# Patient Record
Sex: MALE | Race: WHITE | NOT HISPANIC OR LATINO | Employment: FULL TIME | URBAN - METROPOLITAN AREA
[De-identification: names, ages, dates, MRNs, and addresses within clinical notes are randomized per-mention and may not be internally consistent; named-entity substitution may affect disease eponyms.]

---

## 2022-03-20 ENCOUNTER — APPOINTMENT (OUTPATIENT)
Dept: RADIOLOGY | Facility: CLINIC | Age: 57
End: 2022-03-20
Attending: STUDENT IN AN ORGANIZED HEALTH CARE EDUCATION/TRAINING PROGRAM
Payer: COMMERCIAL

## 2022-03-20 ENCOUNTER — APPOINTMENT (OUTPATIENT)
Dept: CARDIOLOGY | Facility: CLINIC | Age: 57
End: 2022-03-20
Attending: INTERNAL MEDICINE
Payer: COMMERCIAL

## 2022-03-20 ENCOUNTER — HOSPITAL ENCOUNTER (EMERGENCY)
Facility: CLINIC | Age: 57
Discharge: HOME | End: 2022-03-20
Attending: STUDENT IN AN ORGANIZED HEALTH CARE EDUCATION/TRAINING PROGRAM | Admitting: INTERNAL MEDICINE
Payer: COMMERCIAL

## 2022-03-20 VITALS
SYSTOLIC BLOOD PRESSURE: 137 MMHG | HEIGHT: 71 IN | WEIGHT: 180 LBS | HEART RATE: 77 BPM | BODY MASS INDEX: 25.2 KG/M2 | RESPIRATION RATE: 15 BRPM | DIASTOLIC BLOOD PRESSURE: 95 MMHG | TEMPERATURE: 98 F | OXYGEN SATURATION: 95 %

## 2022-03-20 DIAGNOSIS — I42.9 CARDIOMYOPATHY, UNSPECIFIED TYPE (H): ICD-10-CM

## 2022-03-20 DIAGNOSIS — F15.10 METHAMPHETAMINE ABUSE (H): ICD-10-CM

## 2022-03-20 DIAGNOSIS — R07.9 CHEST PAIN, UNSPECIFIED TYPE: ICD-10-CM

## 2022-03-20 DIAGNOSIS — I50.21 ACUTE SYSTOLIC CONGESTIVE HEART FAILURE (H): Primary | ICD-10-CM

## 2022-03-20 PROBLEM — R06.02 SHORTNESS OF BREATH: Status: ACTIVE | Noted: 2022-03-20

## 2022-03-20 LAB
ALBUMIN SERPL-MCNC: 3.5 G/DL (ref 3.5–5)
ALP SERPL-CCNC: 70 U/L (ref 45–120)
ALT SERPL W P-5'-P-CCNC: 19 U/L (ref 0–45)
ANION GAP SERPL CALCULATED.3IONS-SCNC: 10 MMOL/L (ref 5–18)
AST SERPL W P-5'-P-CCNC: 21 U/L (ref 0–40)
ATRIAL RATE - MUSE: 94 BPM
BILIRUB DIRECT SERPL-MCNC: 0.2 MG/DL
BILIRUB SERPL-MCNC: 0.4 MG/DL (ref 0–1)
BNP SERPL-MCNC: 1987 PG/ML (ref 0–46)
BUN SERPL-MCNC: 16 MG/DL (ref 8–22)
CALCIUM SERPL-MCNC: 8.7 MG/DL (ref 8.5–10.5)
CHLORIDE BLD-SCNC: 108 MMOL/L (ref 98–107)
CHOLEST SERPL-MCNC: 179 MG/DL
CK SERPL-CCNC: 353 U/L (ref 30–190)
CO2 SERPL-SCNC: 24 MMOL/L (ref 22–31)
CREAT SERPL-MCNC: 1.05 MG/DL (ref 0.7–1.3)
DIASTOLIC BLOOD PRESSURE - MUSE: 110 MMHG
ERYTHROCYTE [DISTWIDTH] IN BLOOD BY AUTOMATED COUNT: 13.5 % (ref 10–15)
GFR SERPL CREATININE-BSD FRML MDRD: 84 ML/MIN/1.73M2
GLUCOSE BLD-MCNC: 95 MG/DL (ref 70–125)
HCT VFR BLD AUTO: 41.9 % (ref 40–53)
HDLC SERPL-MCNC: 51 MG/DL
HGB BLD-MCNC: 13.7 G/DL (ref 13.3–17.7)
HOLD SPECIMEN: NORMAL
INR PPP: 1.06 (ref 0.85–1.15)
INTERPRETATION ECG - MUSE: NORMAL
LDLC SERPL CALC-MCNC: 111 MG/DL
LVEF ECHO: NORMAL
MAGNESIUM SERPL-MCNC: 1.9 MG/DL (ref 1.8–2.6)
MCH RBC QN AUTO: 29.7 PG (ref 26.5–33)
MCHC RBC AUTO-ENTMCNC: 32.7 G/DL (ref 31.5–36.5)
MCV RBC AUTO: 91 FL (ref 78–100)
P AXIS - MUSE: 49 DEGREES
PLATELET # BLD AUTO: 219 10E3/UL (ref 150–450)
POTASSIUM BLD-SCNC: 4.1 MMOL/L (ref 3.5–5)
PR INTERVAL - MUSE: 132 MS
PROT SERPL-MCNC: 6.3 G/DL (ref 6–8)
QRS DURATION - MUSE: 104 MS
QT - MUSE: 394 MS
QTC - MUSE: 492 MS
R AXIS - MUSE: -24 DEGREES
RBC # BLD AUTO: 4.62 10E6/UL (ref 4.4–5.9)
SARS-COV-2 RNA RESP QL NAA+PROBE: NEGATIVE
SODIUM SERPL-SCNC: 142 MMOL/L (ref 136–145)
SYSTOLIC BLOOD PRESSURE - MUSE: 153 MMHG
T AXIS - MUSE: -17 DEGREES
TRIGL SERPL-MCNC: 83 MG/DL
TROPONIN I SERPL-MCNC: 0.06 NG/ML (ref 0–0.29)
TROPONIN I SERPL-MCNC: 0.07 NG/ML (ref 0–0.29)
TSH SERPL DL<=0.005 MIU/L-ACNC: 3.4 UIU/ML (ref 0.3–5)
VENTRICULAR RATE- MUSE: 94 BPM
WBC # BLD AUTO: 5.6 10E3/UL (ref 4–11)

## 2022-03-20 PROCEDURE — 87635 SARS-COV-2 COVID-19 AMP PRB: CPT | Performed by: STUDENT IN AN ORGANIZED HEALTH CARE EDUCATION/TRAINING PROGRAM

## 2022-03-20 PROCEDURE — 85610 PROTHROMBIN TIME: CPT | Performed by: STUDENT IN AN ORGANIZED HEALTH CARE EDUCATION/TRAINING PROGRAM

## 2022-03-20 PROCEDURE — 96372 THER/PROPH/DIAG INJ SC/IM: CPT | Mod: 59 | Performed by: INTERNAL MEDICINE

## 2022-03-20 PROCEDURE — 96375 TX/PRO/DX INJ NEW DRUG ADDON: CPT

## 2022-03-20 PROCEDURE — 84484 ASSAY OF TROPONIN QUANT: CPT | Performed by: STUDENT IN AN ORGANIZED HEALTH CARE EDUCATION/TRAINING PROGRAM

## 2022-03-20 PROCEDURE — 250N000011 HC RX IP 250 OP 636: Performed by: INTERNAL MEDICINE

## 2022-03-20 PROCEDURE — 84443 ASSAY THYROID STIM HORMONE: CPT | Performed by: INTERNAL MEDICINE

## 2022-03-20 PROCEDURE — 82248 BILIRUBIN DIRECT: CPT | Performed by: INTERNAL MEDICINE

## 2022-03-20 PROCEDURE — 36415 COLL VENOUS BLD VENIPUNCTURE: CPT | Performed by: STUDENT IN AN ORGANIZED HEALTH CARE EDUCATION/TRAINING PROGRAM

## 2022-03-20 PROCEDURE — 99285 EMERGENCY DEPT VISIT HI MDM: CPT | Mod: 25

## 2022-03-20 PROCEDURE — 82550 ASSAY OF CK (CPK): CPT | Performed by: INTERNAL MEDICINE

## 2022-03-20 PROCEDURE — 99223 1ST HOSP IP/OBS HIGH 75: CPT | Mod: 25 | Performed by: INTERNAL MEDICINE

## 2022-03-20 PROCEDURE — 80053 COMPREHEN METABOLIC PANEL: CPT | Performed by: STUDENT IN AN ORGANIZED HEALTH CARE EDUCATION/TRAINING PROGRAM

## 2022-03-20 PROCEDURE — 71045 X-RAY EXAM CHEST 1 VIEW: CPT

## 2022-03-20 PROCEDURE — 80061 LIPID PANEL: CPT | Performed by: INTERNAL MEDICINE

## 2022-03-20 PROCEDURE — 84484 ASSAY OF TROPONIN QUANT: CPT | Performed by: INTERNAL MEDICINE

## 2022-03-20 PROCEDURE — 85027 COMPLETE CBC AUTOMATED: CPT | Performed by: STUDENT IN AN ORGANIZED HEALTH CARE EDUCATION/TRAINING PROGRAM

## 2022-03-20 PROCEDURE — C9803 HOPD COVID-19 SPEC COLLECT: HCPCS

## 2022-03-20 PROCEDURE — 255N000002 HC RX 255 OP 636: Performed by: FAMILY MEDICINE

## 2022-03-20 PROCEDURE — 250N000011 HC RX IP 250 OP 636: Performed by: FAMILY MEDICINE

## 2022-03-20 PROCEDURE — 250N000013 HC RX MED GY IP 250 OP 250 PS 637: Performed by: INTERNAL MEDICINE

## 2022-03-20 PROCEDURE — 36415 COLL VENOUS BLD VENIPUNCTURE: CPT | Performed by: INTERNAL MEDICINE

## 2022-03-20 PROCEDURE — 93005 ELECTROCARDIOGRAM TRACING: CPT | Performed by: STUDENT IN AN ORGANIZED HEALTH CARE EDUCATION/TRAINING PROGRAM

## 2022-03-20 PROCEDURE — 99236 HOSP IP/OBS SAME DATE HI 85: CPT | Performed by: INTERNAL MEDICINE

## 2022-03-20 PROCEDURE — 83880 ASSAY OF NATRIURETIC PEPTIDE: CPT | Performed by: STUDENT IN AN ORGANIZED HEALTH CARE EDUCATION/TRAINING PROGRAM

## 2022-03-20 PROCEDURE — 93306 TTE W/DOPPLER COMPLETE: CPT | Mod: 26 | Performed by: INTERNAL MEDICINE

## 2022-03-20 PROCEDURE — 99239 HOSP IP/OBS DSCHRG MGMT >30: CPT | Performed by: FAMILY MEDICINE

## 2022-03-20 PROCEDURE — 96374 THER/PROPH/DIAG INJ IV PUSH: CPT | Mod: 59

## 2022-03-20 PROCEDURE — 83735 ASSAY OF MAGNESIUM: CPT | Performed by: INTERNAL MEDICINE

## 2022-03-20 RX ORDER — FUROSEMIDE 10 MG/ML
40 INJECTION INTRAMUSCULAR; INTRAVENOUS EVERY 12 HOURS
Status: DISCONTINUED | OUTPATIENT
Start: 2022-03-20 | End: 2022-03-20 | Stop reason: HOSPADM

## 2022-03-20 RX ORDER — LISINOPRIL 5 MG/1
5 TABLET ORAL DAILY
Status: DISCONTINUED | OUTPATIENT
Start: 2022-03-20 | End: 2022-03-20 | Stop reason: HOSPADM

## 2022-03-20 RX ORDER — CARVEDILOL 3.12 MG/1
3.12 TABLET ORAL 2 TIMES DAILY WITH MEALS
Status: DISCONTINUED | OUTPATIENT
Start: 2022-03-20 | End: 2022-03-20 | Stop reason: HOSPADM

## 2022-03-20 RX ORDER — ACETAMINOPHEN 325 MG/1
325 TABLET ORAL EVERY 6 HOURS PRN
COMMUNITY
End: 2022-03-20

## 2022-03-20 RX ORDER — ASPIRIN 81 MG/1
81 TABLET, CHEWABLE ORAL DAILY
COMMUNITY
End: 2022-03-20

## 2022-03-20 RX ORDER — FUROSEMIDE 40 MG
40 TABLET ORAL DAILY
Qty: 30 TABLET | Refills: 1 | Status: SHIPPED | OUTPATIENT
Start: 2022-03-20 | End: 2022-05-02

## 2022-03-20 RX ORDER — SPIRONOLACTONE 25 MG/1
12.5 TABLET ORAL DAILY
Qty: 30 TABLET | Refills: 1 | Status: SHIPPED | OUTPATIENT
Start: 2022-03-20 | End: 2022-05-02

## 2022-03-20 RX ORDER — FUROSEMIDE 10 MG/ML
40 INJECTION INTRAMUSCULAR; INTRAVENOUS ONCE
Status: DISCONTINUED | OUTPATIENT
Start: 2022-03-20 | End: 2022-03-20 | Stop reason: HOSPADM

## 2022-03-20 RX ORDER — OMEGA-3 FATTY ACIDS/FISH OIL 300-1000MG
400 CAPSULE ORAL EVERY 4 HOURS PRN
Status: ON HOLD | COMMUNITY
End: 2022-04-04

## 2022-03-20 RX ORDER — AMLODIPINE AND BENAZEPRIL HYDROCHLORIDE 10; 20 MG/1; MG/1
1 CAPSULE ORAL DAILY
COMMUNITY
Start: 2021-05-04 | End: 2022-03-20

## 2022-03-20 RX ORDER — LIDOCAINE 40 MG/G
CREAM TOPICAL
Status: DISCONTINUED | OUTPATIENT
Start: 2022-03-20 | End: 2022-03-20 | Stop reason: HOSPADM

## 2022-03-20 RX ORDER — SPIRONOLACTONE 25 MG
12.5 TABLET ORAL DAILY
Status: DISCONTINUED | OUTPATIENT
Start: 2022-03-20 | End: 2022-03-20 | Stop reason: HOSPADM

## 2022-03-20 RX ORDER — LISINOPRIL 5 MG/1
5 TABLET ORAL DAILY
Qty: 30 TABLET | Refills: 1 | Status: SHIPPED | OUTPATIENT
Start: 2022-03-20 | End: 2022-05-02

## 2022-03-20 RX ORDER — CARVEDILOL 3.12 MG/1
3.12 TABLET ORAL 2 TIMES DAILY WITH MEALS
Qty: 60 TABLET | Refills: 1 | Status: SHIPPED | OUTPATIENT
Start: 2022-03-20 | End: 2022-04-21

## 2022-03-20 RX ORDER — FUROSEMIDE 20 MG
40 TABLET ORAL DAILY
Status: DISCONTINUED | OUTPATIENT
Start: 2022-03-20 | End: 2022-03-20 | Stop reason: HOSPADM

## 2022-03-20 RX ORDER — AMITRIPTYLINE HYDROCHLORIDE 100 MG/1
100 TABLET ORAL AT BEDTIME
Status: ON HOLD | COMMUNITY
Start: 2021-05-04 | End: 2022-04-04

## 2022-03-20 RX ADMIN — ENOXAPARIN SODIUM 40 MG: 100 INJECTION SUBCUTANEOUS at 08:50

## 2022-03-20 RX ADMIN — LISINOPRIL 5 MG: 5 TABLET ORAL at 12:31

## 2022-03-20 RX ADMIN — SPIRONOLACTONE 12.5 MG: 25 TABLET ORAL at 12:31

## 2022-03-20 RX ADMIN — FUROSEMIDE 40 MG: 10 INJECTION, SOLUTION INTRAVENOUS at 11:32

## 2022-03-20 RX ADMIN — CARVEDILOL 3.12 MG: 3.12 TABLET, FILM COATED ORAL at 12:31

## 2022-03-20 RX ADMIN — PERFLUTREN 3 ML: 6.52 INJECTION, SUSPENSION INTRAVENOUS at 08:30

## 2022-03-20 ASSESSMENT — ACTIVITIES OF DAILY LIVING (ADL)
ADLS_ACUITY_SCORE: 8

## 2022-03-20 ASSESSMENT — ENCOUNTER SYMPTOMS
DIFFICULTY URINATING: 0
DYSURIA: 0
DIARRHEA: 0
VOMITING: 0
COUGH: 1
ABDOMINAL PAIN: 0
FEVER: 0
HEMATURIA: 0
FREQUENCY: 0
SHORTNESS OF BREATH: 1

## 2022-03-20 NOTE — ED TRIAGE NOTES
"Pt arrives with complaints of chest pain, fatigue, and leg swelling. Pt having symptoms since last week. Seen at [facility] was told he had a \"minor heart attack.\" Symptoms continuing this week, increased fatigue and worsening chest pain the last week. Worse when walking.   "

## 2022-03-20 NOTE — CONSULTS
Thank you, Dr. Stratton, for asking the [facility] team to see Mr. Kvng Vann for evaluation of CHF and shortness of breath.      Assessment/Recommendations   Assessment/Plan:  1.  Acute systolic congestive heart failure, cardiomyopathy, LVEF 25%: The patient developed shortness of breath on exertion over last several months, gradually getting worse especially over last 2 to 3 weeks.  He denies chest pain.  His troponins are normal.  His ECG showed nonspecific T wave abnormality, sinus rhythm.  His BNP is significantly elevated to 1987.  He has crackles in both lung bases.  He has a bilateral leg edema.  His echocardiogram final report is pending, preliminarily reviewed it, LV is dilated with severe global hypokinesis, estimated LVEF of 25%.  The etiology of his cardiomyopathy and the congestive heart failure most likely is secondary to methamphetamine drug abuse.  However, his mother  of a massive heart attack at age 36.  We discussed further evaluation and management.  I tried to convince the patient to stay in the hospital, start medications and have coronary angiogram evaluation tomorrow.  However, the patient insisted on leaving hospital.  He left AMA on  even his troponin was elevated at that time.  After discussion, the patient still does not want to be hospitalized.  He would like to have coronary angiogram with possible PCI as outpatient.  Meantime, give 1 dose of IV Lasix 40 mg.  If he is discharged, I recommend starting CHF medications including carvedilol 3.125 mg twice a day, lisinopril 5 mg daily, spironolactone 12.5 mg daily, Lasix of 40 mg daily.  He should follow-up with PCP in 2 to 3 days.  He is scheduled to have a coronary angiogram with possible PCI as outpatient.  He is scheduled to follow-up with the heart failure clinic in 7 to 10 days, follow-up with me in 4 weeks.  The patient understands that he could have acute heart attack, cardiac arrest if he did not  "stay in the hospital to complete the cardiac evaluation.    2.  Methamphetamine abuse: Discussed to quit methamphetamine abuse.  The patient understands now he has severe congestive heart failure.  He will quit as he said.         History of Present Illness/Subjective    It is my pleasure to see Kvng Vann at [facility] for evaluation of shortness of breath.  Kvng Vann is a 56 year old male with a medical history of chronic methamphetamine abuse.    The patient states that he developed shortness of breath over last several months, getting worse over last 2 to 3 weeks.  He went to [facility] on March 6, 2022.  He was found to have mildly elevated troponin and diagnosed with non-ST elevation MI.  However he left AMA.  He states that he did not feel well since then.  He complains of shortness of breath on exertion even with minimal activities.  He denies chest pain, palpitations.  He has occasional lightheadedness.  He has mild orthopnea.  He developed a bilateral leg edema.  Not quite sure how much weight he gained.    In the ER, his ECG showed sinus arrhythmia with nonspecific T wave abnormality.  His troponin x2 are normal.  His BNP is significantly elevated to 1987.  The patient received 20 mg of IV Lasix.  He said that he feels better.       Physical Examination Review of Systems   /71 (BP Location: Left arm, Patient Position: Semi-Dueñas's, Cuff Size: Adult Regular)   Pulse 77   Temp 98  F (36.7  C) (Oral)   Resp 15   Ht 1.803 m (5' 11\")   Wt 81.6 kg (180 lb)   SpO2 95%   BMI 25.10 kg/m    Body mass index is 25.1 kg/m .  Wt Readings from Last 3 Encounters:   03/20/22 81.6 kg (180 lb)     No intake or output data in the 24 hours ending 03/20/22 1140    General Appearance:   Awake, Alert, No acute distress.   HEENT:  No scleral icterus; the mucous membranes were moist.   Neck: No cervical bruits. No JVD. No thyromegaly.    Chest: The " spine was straight. The chest was symmetric.   Lungs:   Bibasilar crackles.  No wheezing.   Cardiovascular:   Regular rhythm and rate, normal first and second heart sounds with no murmurs. No rubs or gallops.    Abdomen:  Soft. No tenderness. Bowels sounds are present   Extremities: Equal tibial pulses.  Mild bilateral leg edema.   Skin: No rashes. Warm, Dry.   Musculoskeletal: No tenderness.   Neurologic: Oriented x 3. Mood and affect are appropriate.     A 12 point comprehensive review of systems was negative except as noted.        Medical History  Surgical History Family History Social History   Methamphetamine drug abuse No past surgical history on file. Reviewed: His mother  of massive heart attack at age 36. Social History     Socioeconomic History     Marital status: Single     Spouse name: Not on file     Number of children: Not on file     Years of education: Not on file     Highest education level: Not on file   Occupational History     Not on file   Tobacco Use     Smoking status: Not on file     Smokeless tobacco: Not on file   Substance and Sexual Activity     Alcohol use: Not on file     Drug use: Not on file     Sexual activity: Not on file   Other Topics Concern     Not on file   Social History Narrative     Not on file     Social Determinants of Health     Financial Resource Strain: Not on file   Food Insecurity: Not on file   Transportation Needs: Not on file   Physical Activity: Not on file   Stress: Not on file   Social Connections: Not on file   Intimate Partner Violence: Not on file   Housing Stability: Not on file          Medications  Allergies   Scheduled Meds:    carvedilol  3.125 mg Oral BID w/meals     enoxaparin ANTICOAGULANT  40 mg Subcutaneous Q24H     furosemide  40 mg Intravenous Q12H     furosemide  40 mg Intravenous Once     furosemide  40 mg Oral Daily     lisinopril  5 mg Oral Daily     sodium chloride (PF)  3 mL Intracatheter Q8H     spironolactone  12.5 mg Oral Daily      Continuous Infusions:  PRN Meds:.lidocaine 4%, lidocaine (buffered or not buffered), melatonin, sodium chloride (PF) No Known Allergies      Lab Results    Chemistry/lipid CBC Cardiac Enzymes/BNP/TSH/INR   Lab Results   Component Value Date    CHOL 179 03/20/2022    HDL 51 03/20/2022    TRIG 83 03/20/2022    BUN 16 03/20/2022     03/20/2022    CO2 24 03/20/2022    Lab Results   Component Value Date    WBC 5.6 03/20/2022    HGB 13.7 03/20/2022    HCT 41.9 03/20/2022    MCV 91 03/20/2022     03/20/2022    Lab Results   Component Value Date    TROPONINI 0.06 03/20/2022    BNP 1,987 (H) 03/20/2022    TSH 3.40 03/20/2022    INR 1.06 03/20/2022

## 2022-03-20 NOTE — ED PROVIDER NOTES
NAME: Kvng Vann  AGE: 56 year old male  YOB: 1965  MRN: [id number]  EVALUATION DATE & TIME: No admission date for patient encounter.    PCP: No primary care provider on file.    ED PROVIDER: Stephanie Vasquez MD.      Chief Complaint   Patient presents with     Chest Pain     Fatigue     Leg Swelling         FINAL IMPRESSION:  1. Chest pain, unspecified type        MEDICAL DECISION MAKIN:51 AM I met with the patient, obtained history, performed an initial exam, and discussed options and plan for diagnostics and treatment here in the ED.   3:42 AM I spoke to Dr. Nava - Bonny about the patient and my plan. He repots that the patient would be appropriate for admission here.   4:18 AM I discussed the case with hospitalist, Dr Blakely, who accepts the patient for admission.     Pertinent Labs & Imaging studies reviewed. (See chart for details)  ED Course as of 22 0706   Sun Mar 20, 2022   0659 MDM:   56-year-old male with history of hypertension and asthma who presents with chest pain, leg swelling.  Patient was seen 3/ at [place] for chest pain and found to have an NSTEMI but he left the emergency room AMA.  He states he continues to have exertional left shoulder pain and occasional twinges of pain in his chest.  He is not currently having any pain.  He has also had increased leg swelling and shortness of breath.  On exam he has bilateral lower extremity swelling, left greater than right.  He is otherwise nontoxic-appearing and satting well on room air.  Differential includes but not limited to ACS, PE, dissection, pericarditis, myocarditis, CHF, pneumonia, among others.    His EKG is sinus rhythm with LVH, no ST elevation.  No priors in our records to compare to.  Initial troponin is within normal range at 0.07.  He already took several aspirin over the last 24 hours at home.  His BNP is elevated at 1900.  I discussed with Dr. Gardiner from cardiology who agrees that patient  "is appropriate for admission here at [facility] and does not need an emergent cath.  Patient had recent PE study and DVT ultrasound at his [facility] visit and I have highest suspicion for cardiac cause than PE. Patient is in agreement with admission at this time.  I discussed with the hospitalist who accepted the patient for cardiac telemetry admission.       MEDICATIONS GIVEN IN THE EMERGENCY:  Medications   lidocaine 1 % 0.1-1 mL (has no administration in time range)   lidocaine (LMX4) cream (has no administration in time range)   sodium chloride (PF) 0.9% PF flush 3 mL (3 mLs Intracatheter Not Given 3/20/22 0636)   sodium chloride (PF) 0.9% PF flush 3 mL (has no administration in time range)   melatonin tablet 1 mg (has no administration in time range)   enoxaparin ANTICOAGULANT (LOVENOX) injection 40 mg (has no administration in time range)       NEW PRESCRIPTIONS STARTED AT TODAY'S ER VISIT:  New Prescriptions    No medications on file          =================================================================    HPI    Patient information was obtained from: Patient    Use of : N/A       Kvng Vann is a 56 year old male with a past medical history of HTN a nd asthma who presents to the ED for evaluation of chest pain.     Per chart review, the patient was seen in the [facility] Emergency Department on 3/6/2022 for evaluation of chest pain. At this time the patient reported early this morning at about ~0100, he developed sudden onset of left sided chest pain and shoulder pain. He described his pain as a \"heartburn like sensation,\" lasting for about one hour in duration. There was no associated dyspnea, nausea or diaphoresis with this. This later resolved on its own. At about ~1200, he had a return episode of symptoms. He took one dose of 81mg ASA and drove to the ED. Here he feels his chest pain and shoulder pain has been gradually resolving to where it feels like a 1/10- \"it just feels like " "its going away now.\" He also complains of left lower leg swelling which has been present for some time now- no calf pain with this. He has hypertension but does not take any medications for it. During this visit, the patient's troponin value was elevated to 0.104 and he was told that he was having a heart attack. The patient then left against medical advice because he wanted to go to [facility] as they have \"better cardiac care\".     Today, the patient reports that he \"hasn't been feeling right\". He states that he has been tired, that his shoulder astill ache, and that his chest pain is worse with exertion. He endorses increased leg swelling and a chronic couch. He is otherwise in his usual state of health and denies any fever, abdominal pain, vomiting, diarrhea, urinary issues, or any other cocnerns at this time.     Of note, the patient took two baby aspirins around 7:00 this morning and another around 16:00 this afternoon.     REVIEW OF SYSTEMS   Review of Systems   Constitutional: Negative for fever.   Respiratory: Positive for cough (chronic) and shortness of breath.    Cardiovascular: Positive for chest pain and leg swelling.   Gastrointestinal: Negative for abdominal pain, diarrhea and vomiting.   Genitourinary: Negative for difficulty urinating, dysuria, frequency and hematuria.   All other systems reviewed and are negative.       PAST MEDICAL HISTORY:  No past medical history on file.    PAST SURGICAL HISTORY:  No past surgical history on file.    CURRENT MEDICATIONS:      Current Facility-Administered Medications:      enoxaparin ANTICOAGULANT (LOVENOX) injection 40 mg, 40 mg, Subcutaneous, Q24H, Kamila Blakely MD     lidocaine (LMX4) cream, , Topical, Q1H PRN, Kamila Blakely MD     lidocaine 1 % 0.1-1 mL, 0.1-1 mL, Other, Q1H PRN, Kamila Blakely MD     melatonin tablet 1 mg, 1 mg, Oral, At Bedtime PRN, Kamila Blakely MD     sodium chloride (PF) 0.9% PF flush 3 mL, 3 mL, Intracatheter, Q8H, " "Kamila Blakely MD     sodium chloride (PF) 0.9% PF flush 3 mL, 3 mL, Intracatheter, q1 min prn, Kamila Blakely MD    Current Outpatient Medications:      aspirin (ASA) 81 MG chewable tablet, Take 81 mg by mouth daily, Disp: , Rfl:     ALLERGIES:  No Known Allergies    FAMILY HISTORY:  No family history on file.    SOCIAL HISTORY:   Social History     Socioeconomic History     Marital status: Single     Spouse name: Not on file     Number of children: Not on file     Years of education: Not on file     Highest education level: Not on file   Occupational History     Not on file   Tobacco Use     Smoking status: Not on file     Smokeless tobacco: Not on file   Substance and Sexual Activity     Alcohol use: Not on file     Drug use: Not on file     Sexual activity: Not on file   Other Topics Concern     Not on file   Social History Narrative     Not on file     Social Determinants of Health     Financial Resource Strain: Not on file   Food Insecurity: Not on file   Transportation Needs: Not on file   Physical Activity: Not on file   Stress: Not on file   Social Connections: Not on file   Intimate Partner Violence: Not on file   Housing Stability: Not on file       PHYSICAL EXAM:    Vitals: BP (!) 136/93   Pulse 97   Temp 98.4  F (36.9  C) (Oral)   Resp 17   Ht 1.803 m (5' 11\")   Wt 81.6 kg (180 lb)   SpO2 95%   BMI 25.10 kg/m     Constitutional: Well developed, well nourished. Comfortable appearing.  HENT: Normocephalic, atraumatic, mucous membranes moist, nose normal. Neck- Supple, gross ROM intact.   Eyes: Pupils mid-range, sclera white, no discharge  Respiratory: Clear to auscultation bilaterally, no respiratory distress, no wheezing, speaks full sentences easily.  Cardiovascular: Normal heart rate, regular rhythm, no murmurs. Bilateral lower extremity swelling (L>R). 2+ radial pulses.   GI: Soft, no tenderness to deep palpation in all quadrants, no masses.  Musculoskeletal: Moving all 4 extremities " intentionally and without pain. No obvious deformity.  Skin: Warm, dry, no rash.  Neurologic: Alert & oriented x 3, speech clear, moving all extremities spontaneously   Psychiatric: Affect normal, cooperative.     LAB:  All pertinent labs reviewed and interpreted.  Labs Ordered and Resulted from Time of ED Arrival to Time of ED Departure   BASIC METABOLIC PANEL - Abnormal       Result Value    Sodium 142      Potassium 4.1      Chloride 108 (*)     Carbon Dioxide (CO2) 24      Anion Gap 10      Urea Nitrogen 16      Creatinine 1.05      Calcium 8.7      Glucose 95      GFR Estimate 84     B-TYPE NATRIURETIC PEPTIDE ([facility] ONLY) - Abnormal    BNP 1,987 (*)    CK TOTAL - Abnormal     (*)    CBC WITH PLATELETS - Normal    WBC Count 5.6      RBC Count 4.62      Hemoglobin 13.7      Hematocrit 41.9      MCV 91      MCH 29.7      MCHC 32.7      RDW 13.5      Platelet Count 219     TROPONIN I - Normal    Troponin I 0.07     INR - Normal    INR 1.06     COVID-19 VIRUS (CORONAVIRUS) BY PCR - Normal    SARS CoV2 PCR Negative     TSH - Normal    TSH 3.40     MAGNESIUM - Normal    Magnesium 1.9     LIPID PROFILE - Normal    Cholesterol 179      Triglycerides 83      Direct Measure HDL 51      LDL Cholesterol Calculated 111     HEPATIC FUNCTION PANEL - Normal    Bilirubin Total 0.4      Bilirubin Direct 0.2      Protein Total 6.3      Albumin 3.5      Alkaline Phosphatase 70      AST 21      ALT 19     TROPONIN I   ROUTINE UA WITH MICROSCOPIC REFLEX TO CULTURE       RADIOLOGY:  XR Chest Port 1 View   Final Result   IMPRESSION: Heart size at the upper limits of normal normal pulmonary vascularity. No pulmonary infiltrates or pneumothorax. No overt osseous abnormality      Echocardiogram Complete    (Results Pending)       EKG:   Performed at: 02:54:34  Impression: Sinus rhythm with occasional premature ventricular complexes. Minimal voltage criteria for LVH, may be normal variant. Nonspecific T wave abnormality. Prolonged  QT. Abnormal ECG.  Rate: 94  Rhythm: Sinus.  QRS Interval: 104  QTc Interval: 492  Comparison: No previous ECG available for comparison.  I have independently reviewed and interpreted the EKG(s) documented above.     I, Gary Guzman, am serving as a scribe to document services personally performed by Dr. Stephanie Vasquez based on my observation and the provider's statements to me. I, Stephanie Vasquez MD attest that Gary Guzman is acting in a scribe capacity, has observed my performance of the services and has documented them in accordance with my direction.      Stephanie Vasquez M.D.  Emergency Medicine  [facility] EMERGENCY ROOM  [address] [address] [phone]  Dept: [phone]     Stephanie Vasquez MD  03/20/22 0706

## 2022-03-20 NOTE — PROGRESS NOTES
[facility] MEDICINE PROGRESS NOTE      Identification: Kvng Vann is a 56 year old male     Assessment and Plan:  This is 56 years old male with past medical history of hypertension not on any medication and methamphetamine usage.  Patient presented to emergency department early this morning with a concern of shortness of breath on exertion and chest pain.  He was recently seen at [facility] emergency department on 3/6/2022 for evaluation of chest pain.  He was diagnosed with NSTEMI.  However patient left AMA.  He told me that he did not like the way he was treated at [facility].    Recurrent Chest pain with dyspnea on exertion.  Currently pain-free. Ruled out ACS with neg trop and EKG  -3/6/22 diagnosed with NSTEMI at [facility], left AMA w/o further cardiac workup.  -Echocardiogram done pending interpretation from cardiologist  -consult cardiology.  Pending further recommendation possible coronary angiogram.  However patient would like to leave the hospital as soon as possible.  He would like cardiology to arrange for outpatient procedure.  -lipid panel ok    Dyspnea on exertion. Not hypoxic on room air.  Recent NSTEMI. CXR wnl. BLE edema with elevated BNP consistent with CHF. ? Heart failure due to ischemic cardiomyopathy  -Pending echocardiogram  -We will give Lasix.  Have not received any Lasix since arrival    Hx of HTN, not on any treatment  -consider diuretic, ACEI, BB depending if Echo with any decrease EF    Substance abuse, meth use  -Discussed about cessation    Diet: Combination Diet Low Saturated Fat Na <2400mg Diet, No Caffeine Diet  DVT Prophylaxis:  Low Risk/Ambulatory with no VTE prophylaxis indicated  Code Status: Full Code    Anticipated possible discharge: possible later today after seen by cardiology     Interval History/Subjective:  Patient denies any chest pain.  Stated that he is breathing okay if not moving around.  He does not want to stay  in the hospital.  He was asking about coming back for further work-up.  He would like to leave as soon as possible.  I discussed with patient about my concern of outside hospital heart attack.  I am asking cardiology to see patient.  He had echocardiogram done pending cardiology's interpretation    Physical Exam/Objective:  Temp:  [98  F (36.7  C)-98.4  F (36.9  C)] 98  F (36.7  C)  Pulse:  [77-99] 77  Resp:  [12-20] 15  BP: (112-153)/() 115/71  SpO2:  [94 %-96 %] 95 %    GENERAL:  Alert, appears comfortable, in no acute distress, appears stated age   HEAD:  Normocephalic, without obvious abnormality, atraumatic   EYES:  PERRL, conjunctiva/corneas clear, no scleral icterus, EOM's intact   NECK: Supple, symmetrical, trachea midline   LUNGS:   Clear to auscultation bilaterally, no rales, rhonchi, or wheezing, symmetric chest rise on inhalation, respirations unlabored   HEART:  Regular rate and rhythm, S1 and S2 normal, no murmur, rub, or gallop    ABDOMEN:   Soft, non-tender, bowel sounds active all four quadrants, no masses, no organomegaly, no rebound or guarding   EXTREMITIES: Extremities normal, atraumatic, no cyanosis or edema    SKIN: Dry to touch, no exanthems in the visualized areas   NEURO: Alert, oriented x3, moves all four extremities freely   PSYCH: Cooperative, behavior is appropriate      Medications:   Personally Reviewed.  Medications       enoxaparin ANTICOAGULANT  40 mg Subcutaneous Q24H     sodium chloride (PF)  3 mL Intracatheter Q8H       Data reviewed today: I personally reviewed all new medications, labs, imaging/diagnostics reports over the past 24 hours. Pertinent findings include:    Imaging:   Recent Results (from the past 24 hour(s))   XR Chest Port 1 View    Narrative    EXAM: XR CHEST PORT 1 VIEW  LOCATION: [facility]  DATE/TIME: 3/20/2022 3:10 AM    INDICATION: Chest pain and shortness of breath  COMPARISON: None.      Impression    IMPRESSION: Heart size  at the upper limits of normal normal pulmonary vascularity. No pulmonary infiltrates or pneumothorax. No overt osseous abnormality       Labs:  Today's lab personally reviewed    Damian Stratton MD  [facility]  Phone: #[phone]

## 2022-03-20 NOTE — PHARMACY-ADMISSION MEDICATION HISTORY
Pharmacy Note - Admission Medication History    Pertinent Provider Information: pt states that he is supposed to be taking medication for BP but he hasn't been taking it. At that time, also was ordered amitriptyline for arthritis and also has not been taking it. No fill history through insurance in past year, both of those meds were ordered in 05/2021.   ______________________________________________________________________    Prior To Admission (PTA) med list completed and updated in EMR.       PTA Med List   Medication Sig Note Last Dose     acetaminophen (TYLENOL) 325 MG tablet Take 325 mg by mouth every 6 hours as needed for mild pain  Unknown at prn     amitriptyline (ELAVIL) 100 MG tablet Take 100 mg by mouth At Bedtime 3/20/2022: Pt state that he should be taking for arthritis, no fill hx through insurance in last year. Ordered in 05/2021 Unknown at last year     amLODIPine-benazepril (LOTREL) 10-20 MG capsule Take 1 capsule by mouth daily 3/20/2022: Pt state that he should be taking for BP, no fill hx through insurance in last year. Ordered in 05/2021 Unknown at last year     aspirin (ASA) 81 MG EC tablet Take 81 mg by mouth daily  3/19/2022 at Unknown time     ibuprofen (ADVIL/MOTRIN) 200 MG capsule Take 400 mg by mouth every 4 hours as needed for mild pain or fever  Unknown at prn       [facility](s[facility]  Method of interview communication: in-person    Summary of Changes to PTA Med List  New: Tylenol, Advil  Discontinued: none  Changed: none    Patient was asked about OTC/herbal products specifically.  PTA med list reflects this.    In the past week, patient estimated taking medication this percent of the time:  less than 50% due to not taking.    Allergies were reviewed, assessed, and updated with the patient.      Patient does not use any multi-dose medications prior to admission.    The information provided in this note is only as accurate as the sources  available at the time of the update(s).    Thank you for the opportunity to participate in the care of this patient.    Ciera Yoon, PharmD  3/20/2022 9:21 AM

## 2022-03-20 NOTE — H&P (VIEW-ONLY)
Thank you, Dr. Stratton, for asking the [facility] Heart Care team to see Mr. Kvng Vann for evaluation of CHF and shortness of breath.      Assessment/Recommendations   Assessment/Plan:  1.  Acute systolic congestive heart failure, cardiomyopathy, LVEF 25%: The patient developed shortness of breath on exertion over last several months, gradually getting worse especially over last 2 to 3 weeks.  He denies chest pain.  His troponins are normal.  His ECG showed nonspecific T wave abnormality, sinus rhythm.  His BNP is significantly elevated to 1987.  He has crackles in both lung bases.  He has a bilateral leg edema.  His echocardiogram final report is pending, preliminarily reviewed it, LV is dilated with severe global hypokinesis, estimated LVEF of 25%.  The etiology of his cardiomyopathy and the congestive heart failure most likely is secondary to methamphetamine drug abuse.  However, his mother  of a massive heart attack at age 36.  We discussed further evaluation and management.  I tried to convince the patient to stay in the hospital, start medications and have coronary angiogram evaluation tomorrow.  However, the patient insisted on leaving hospital.  He left AMA on  even his troponin was elevated at that time.  After discussion, the patient still does not want to be hospitalized.  He would like to have coronary angiogram with possible PCI as outpatient.  Meantime, give 1 dose of IV Lasix 40 mg.  If he is discharged, I recommend starting CHF medications including carvedilol 3.125 mg twice a day, lisinopril 5 mg daily, spironolactone 12.5 mg daily, Lasix of 40 mg daily.  He should follow-up with PCP in 2 to 3 days.  He is scheduled to have a coronary angiogram with possible PCI as outpatient.  He is scheduled to follow-up with the heart failure clinic in 7 to 10 days, follow-up with me in 4 weeks.  The patient understands that he could have acute heart attack, cardiac arrest if he did not  "stay in the hospital to complete the cardiac evaluation.    2.  Methamphetamine abuse: Discussed to quit methamphetamine abuse.  The patient understands now he has severe congestive heart failure.  He will quit as he said.         History of Present Illness/Subjective    It is my pleasure to see Kvng Vann at [facility] for evaluation of shortness of breath.  Kvng Vann is a 56 year old male with a medical history of chronic methamphetamine abuse.    The patient states that he developed shortness of breath over last several months, getting worse over last 2 to 3 weeks.  He went to [facility] on March 6, 2022.  He was found to have mildly elevated troponin and diagnosed with non-ST elevation MI.  However he left AMA.  He states that he did not feel well since then.  He complains of shortness of breath on exertion even with minimal activities.  He denies chest pain, palpitations.  He has occasional lightheadedness.  He has mild orthopnea.  He developed a bilateral leg edema.  Not quite sure how much weight he gained.    In the ER, his ECG showed sinus arrhythmia with nonspecific T wave abnormality.  His troponin x2 are normal.  His BNP is significantly elevated to 1987.  The patient received 20 mg of IV Lasix.  He said that he feels better.       Physical Examination Review of Systems   /71 (BP Location: Left arm, Patient Position: Semi-Dueñas's, Cuff Size: Adult Regular)   Pulse 77   Temp 98  F (36.7  C) (Oral)   Resp 15   Ht 1.803 m (5' 11\")   Wt 81.6 kg (180 lb)   SpO2 95%   BMI 25.10 kg/m    Body mass index is 25.1 kg/m .  Wt Readings from Last 3 Encounters:   03/20/22 81.6 kg (180 lb)     No intake or output data in the 24 hours ending 03/20/22 1140    General Appearance:   Awake, Alert, No acute distress.   HEENT:  No scleral icterus; the mucous membranes were moist.   Neck: No cervical bruits. No JVD. No thyromegaly.    Chest: The " spine was straight. The chest was symmetric.   Lungs:   Bibasilar crackles.  No wheezing.   Cardiovascular:   Regular rhythm and rate, normal first and second heart sounds with no murmurs. No rubs or gallops.    Abdomen:  Soft. No tenderness. Bowels sounds are present   Extremities: Equal tibial pulses.  Mild bilateral leg edema.   Skin: No rashes. Warm, Dry.   Musculoskeletal: No tenderness.   Neurologic: Oriented x 3. Mood and affect are appropriate.     A 12 point comprehensive review of systems was negative except as noted.        Medical History  Surgical History Family History Social History   Methamphetamine drug abuse No past surgical history on file. Reviewed: His mother  of massive heart attack at age 36. Social History     Socioeconomic History     Marital status: Single     Spouse name: Not on file     Number of children: Not on file     Years of education: Not on file     Highest education level: Not on file   Occupational History     Not on file   Tobacco Use     Smoking status: Not on file     Smokeless tobacco: Not on file   Substance and Sexual Activity     Alcohol use: Not on file     Drug use: Not on file     Sexual activity: Not on file   Other Topics Concern     Not on file   Social History Narrative     Not on file     Social Determinants of Health     Financial Resource Strain: Not on file   Food Insecurity: Not on file   Transportation Needs: Not on file   Physical Activity: Not on file   Stress: Not on file   Social Connections: Not on file   Intimate Partner Violence: Not on file   Housing Stability: Not on file          Medications  Allergies   Scheduled Meds:    carvedilol  3.125 mg Oral BID w/meals     enoxaparin ANTICOAGULANT  40 mg Subcutaneous Q24H     furosemide  40 mg Intravenous Q12H     furosemide  40 mg Intravenous Once     furosemide  40 mg Oral Daily     lisinopril  5 mg Oral Daily     sodium chloride (PF)  3 mL Intracatheter Q8H     spironolactone  12.5 mg Oral Daily      Continuous Infusions:  PRN Meds:.lidocaine 4%, lidocaine (buffered or not buffered), melatonin, sodium chloride (PF) No Known Allergies      Lab Results    Chemistry/lipid CBC Cardiac Enzymes/BNP/TSH/INR   Lab Results   Component Value Date    CHOL 179 03/20/2022    HDL 51 03/20/2022    TRIG 83 03/20/2022    BUN 16 03/20/2022     03/20/2022    CO2 24 03/20/2022    Lab Results   Component Value Date    WBC 5.6 03/20/2022    HGB 13.7 03/20/2022    HCT 41.9 03/20/2022    MCV 91 03/20/2022     03/20/2022    Lab Results   Component Value Date    TROPONINI 0.06 03/20/2022    BNP 1,987 (H) 03/20/2022    TSH 3.40 03/20/2022    INR 1.06 03/20/2022

## 2022-03-20 NOTE — DISCHARGE SUMMARY
[facility]  Hospitalist Discharge Summary      Date of Admission:  3/20/2022  Date of Discharge:  3/20/2022  Discharging Provider: Damian Stratton MD  Discharge Service: Hospitalist Service    Discharge Diagnoses   Acute systolic congestive heart failure  Cardiomyopathy  Hypertension  Methamphetamine abuse    Follow-ups Needed After Discharge   Follow-up Appointments     Follow-up and recommended labs and tests       Follow-up with heart failure clinic within 7- 10 days.  Follow-up with cardiologist Jaden Nix MD in 4 wks             Unresulted Labs Ordered in the Past 30 Days of this Admission     No orders found from 2/18/2022 to 3/21/2022.          Discharge Disposition   Discharged to home  Condition at discharge: Stable      Hospital Course   Please see H&P note and my progress notes for detail.  In summary, this is a 56 years old male with history of methamphetamine abuse, untreated hypertension and recent NSTEMI on 3/6/2022.  On 3/6/2022 patient presented to [facility] for chest pain found to have NSTEMI but left AMA without further work-up.  Today patient presented to the ED with a concern of intermittent chest pain and dyspnea on exertion.  He found to have acute congestive heart failure.  Echocardiogram done today, final report is pending.  Preliminary result show LVEF 25% with LV dilated and severe global hypokinesis.  Cardiologist was consulted.  We tried to convince patient to stay for further treatments and work-up.  However patient adamantly would like to be discharged home. Pt spoke with cardiologist and agree to have outpatient coronary angiogram and possible PCI  Cardiologist started patient on carvedilol, lisinopril, spironolactone and Lasix.  Patient received 1 dose of IV Lasix before discharge  Patient was instructed to follow-up with heart failure clinic in 7 to 10 days and follow-up with cardiologist in 4 weeks.  Outpatient coronary angiogram will be arranged by  cardiology office    Consultations This Hospital Stay   CARDIOLOGY IP CONSULT    Code Status   Full Code    Time Spent on this Encounter   I, Damian Stratton MD, personally saw the patient today and spent greater than 30 minutes discharging this patient.       Damian Stratton MD  [facility] EMERGENCY ROOM  [address] [address]  Phone: [phone]  Fax: [phone]  ______________________________________________________________________    Physical Exam   Vital Signs: Temp: 98  F (36.7  C) Temp src: Oral BP: 115/71 Pulse: 77   Resp: 15 SpO2: 95 % O2 Device: None (Room air)    Weight: 180 lbs 0 oz  General Appearance: NAD, not on oxygen. Denied sob while sitting  Respiratory: some crackles at base. Otherwise clear  Cardiovascular: RRR  GI: soft, nt, nd  Skin: no lesion  Other: BLE +2 edema         Primary Care Physician   [facility]    Discharge Orders      Reason for your hospital stay    Chest pain and shortness of breath with exertion due to congestive heart failure exacerbation     Follow-up and recommended labs and tests     Follow-up with heart failure clinic within 7- 10 days.  Follow-up with cardiologist Jaden Nix MD in 4 wks     Activity    Your activity upon discharge: no lifting, driving, or strenuous exercise     Diet    Follow this diet upon discharge: Low cholesterol, Low fat, and Low salt       Significant Results and Procedures   Most Recent 3 BMP's:Recent Labs   Lab Test 03/20/22  0257      POTASSIUM 4.1   CHLORIDE 108*   CO2 24   BUN 16   CR 1.05   ANIONGAP 10   ILDEFONSO 8.7   GLC 95     Most Recent 3 Troponin's:No lab results found.  Most Recent 3 BNP's:No lab results found.    Discharge Medications   Current Discharge Medication List      START taking these medications    Details   carvedilol (COREG) 3.125 MG tablet Take 1 tablet (3.125 mg) by mouth 2 times daily (with meals)  Qty: 60 tablet, Refills: 1    Associated Diagnoses:  Acute systolic congestive heart failure (H)      furosemide (LASIX) 40 MG tablet Take 1 tablet (40 mg) by mouth daily  Qty: 30 tablet, Refills: 1    Associated Diagnoses: Acute systolic congestive heart failure (H)      lisinopril (ZESTRIL) 5 MG tablet Take 1 tablet (5 mg) by mouth daily  Qty: 30 tablet, Refills: 1    Associated Diagnoses: Acute systolic congestive heart failure (H)      spironolactone (ALDACTONE) 25 MG tablet Take 0.5 tablets (12.5 mg) by mouth daily  Qty: 30 tablet, Refills: 1    Associated Diagnoses: Acute systolic congestive heart failure (H)         CONTINUE these medications which have NOT CHANGED    Details   amitriptyline (ELAVIL) 100 MG tablet Take 100 mg by mouth At Bedtime      aspirin (ASA) 81 MG EC tablet Take 81 mg by mouth daily      ibuprofen (ADVIL/MOTRIN) 200 MG capsule Take 400 mg by mouth every 4 hours as needed for mild pain or fever         STOP taking these medications       acetaminophen (TYLENOL) 325 MG tablet Comments:   Reason for Stopping:         amLODIPine-benazepril (LOTREL) 10-20 MG capsule Comments:   Reason for Stopping:             Allergies   No Known Allergies

## 2022-03-20 NOTE — H&P
"[facility] MEDICINE ADMISSION HISTORY AND PHYSICAL       Assessment & Plan      1. Exertional SOB and leg swelling --- with Elevated BNP almost 2000, looks like new  acute CHF.     He had trop leak last March 6, 2022 when he was seen at the [facility]. Left AMA that time. Trop today is normal.     Possibilities includes CAD, or meth induced CHF, or poorly controlled HTN causing CHF. R/O right sided CHF     - If SOB - give lasix, right now he is comfortable with 94-95% O2 sat -- and did not appear SOB. Chest XR did not appear grossly wet  - Will get ECHO - if EF low -- consider cardiology ref  - Will continue trop checks, add TSH  - check UA for proteinuria  - Check to total CK - body aches  - Check lipids     2. HTN - not on any meds   3. History of chronic Meth use - advised, and will stop \"Now !\"  4. History of smoking since teens - tobacco cessation         VTE prophylaxis: Lovenox    IV fluids: Per order set   Diet:  cardiac  GI prophylaxis: Not indicated at this point, re-assess if needed.   Pain, sleep, and vomiting medications: Per order set  Code Status: Full,   COVID test result:  negative   COVID vaccination: No  Barriers to discharge: admitting clinical condition  Discharge Disposition and goals:  Unable to determine at this point, pending clinical progress and response to treatment. Patient may need transfer to SNF or ACR if unsafe to go home and needed treatment inappropriate at home setting OR may need home health care evaluation if care can be delivered at home settings. Consider referral to care manager/    PPE - I was wearing PPE when I met the patient - N95 mask, Surgical mask, Isolation gown, Gloves, Safety glasses.      Care plan was created based on available information provided, including patient's condition at the time of encounter.   This plan was discussed with patient and/or family members using layman's terms and have agreed to proceed.   At " the end of night shift (9PM - 730A), this case will be presented to the AM Hospitalist.    It is recommended to revise care plan if there is change in condition and/or new clinical information that are not available during my encounter.     All or some of home medication/s were not resumed on admission due to safety reasons or contraindications. Dosing and frequency may also have been modified. Please resume/review them during your visit.     70 minutes of total visit duration and greater than 50% was spent in direct evaluation of patient and coordination of care including discussion of diagnostic test results and recommended treatment. .      Jeremiah Blakely MD, MPH, FACP, [facility]  Internal Medicine - Hospitalist        Chief Complaint SOB      HISTORY     - Met him in the ED. He was awake and alert.     - He came in because of exertional SOB and fatigue. He also has leg swelling. Denies chest pain. He also has pain at the side of his neck to the left shouder since March 6. He also feels achy  - He was seen at [facility] last March 6 for CP and SOB. PE study was negative but showed heart is enlarged. Leg US was negative for DVT. His trop was mildly elevated but decided to leave despite this finding.   - Denies cough or colds. No fevers. No belly pain. No diarrhea    - He still smokes since he was in the teens till now. He denies COPD diagnosis, but had sone asthma when he was young.   - He also admits to using Meth for so many years, and last was last night.  - He has HTN but not taking meds.     - In the ED, chest XR was clear, heart looked enlarged. His BNP was almost 2000. Trop was negative.      - ROS --- No headache. No dizziness. No weakness.  No palpitations. No abdominal pain. No nausea or vomiting. No urinary symptoms. No bleeding symptoms. No weight loss. Rest of 12 point ROS was reviewed and negative.       Past Medical History     htn    Surgical History     No abdominal or chest surgery     Family  "History      (+) for  CAD - maternal side       Social History      .  Social History     Socioeconomic History     Marital status: Single     Spouse name: Not on file     Number of children: Not on file     Years of education: Not on file     Highest education level: Not on file   Occupational History     Not on file   Tobacco Use     Smoking status: Not on file     Smokeless tobacco: Not on file   Substance and Sexual Activity     Alcohol use: Not on file     Drug use: Not on file     Sexual activity: Not on file   Other Topics Concern     Not on file   Social History Narrative     Not on file     Social Determinants of Health     Financial Resource Strain: Not on file   Food Insecurity: Not on file   Transportation Needs: Not on file   Physical Activity: Not on file   Stress: Not on file   Social Connections: Not on file   Intimate Partner Violence: Not on file   Housing Stability: Not on file          Allergies      No Known Allergies      Prior to Admission Medications      No current facility-administered medications on file prior to encounter.  aspirin (ASA) 81 MG chewable tablet, Take 81 mg by mouth daily        Review of Systems     A 12 point comprehensive review of systems was negative except as noted above in HPI.    PHYSICAL EXAMINATION       Vitals      Vitals: BP (!) 153/110   Pulse 95   Temp 98.4  F (36.9  C) (Oral)   Resp 16   Ht 1.803 m (5' 11\")   Wt 81.6 kg (180 lb)   SpO2 96%   BMI 25.10 kg/m    BMI= Body mass index is 25.1 kg/m .      Examination     General Appearance:  Alert, cooperative, no distress  Head:    Normocephalic, without obvious abnormality, atraumatic  EENT:  PERRL, conjunctiva/corneas clear, EOM's intact.   Neck:   Supple, symmetrical, trachea midline, no adenopathy; no NVE  Back:  Symmetric, no curvature, no CVA tenderness  Chest/Lungs: Clear to auscultation bilaterally, respirations unlabored, No tenderness or deformity. No abdominal breathing or use of accessory " muscles.   Heart:    Regular rate and rhythm, S1 and S2 normal, no murmur, rub   or gallop  Abdomen: Soft, non-tender, bowel sounds active all four quadrants, not peritoneal on palpation. Not distended  Extremities:  Bilateral leg swelling   Skin:  Skin color, texture, turgor normal, no rashes or lesion  Neurologic:  Awake and alert, no lateralizing or localizing signs                Pertinent Lab     Results for orders placed or performed during the hospital encounter of 03/20/22   XR Chest Port 1 View    Impression    IMPRESSION: Heart size at the upper limits of normal normal pulmonary vascularity. No pulmonary infiltrates or pneumothorax. No overt osseous abnormality   CBC (+ platelets, no diff)   Result Value Ref Range    WBC Count 5.6 4.0 - 11.0 10e3/uL    RBC Count 4.62 4.40 - 5.90 10e6/uL    Hemoglobin 13.7 13.3 - 17.7 g/dL    Hematocrit 41.9 40.0 - 53.0 %    MCV 91 78 - 100 fL    MCH 29.7 26.5 - 33.0 pg    MCHC 32.7 31.5 - 36.5 g/dL    RDW 13.5 10.0 - 15.0 %    Platelet Count 219 150 - 450 10e3/uL   Basic metabolic panel   Result Value Ref Range    Sodium 142 136 - 145 mmol/L    Potassium 4.1 3.5 - 5.0 mmol/L    Chloride 108 (H) 98 - 107 mmol/L    Carbon Dioxide (CO2) 24 22 - 31 mmol/L    Anion Gap 10 5 - 18 mmol/L    Urea Nitrogen 16 8 - 22 mg/dL    Creatinine 1.05 0.70 - 1.30 mg/dL    Calcium 8.7 8.5 - 10.5 mg/dL    Glucose 95 70 - 125 mg/dL    GFR Estimate 84 >60 mL/min/1.73m2   Troponin I (now)   Result Value Ref Range    Troponin I 0.07 0.00 - 0.29 ng/mL   Result Value Ref Range    INR 1.06 0.85 - 1.15   B-Type Natriuretic Peptide ( East Only)   Result Value Ref Range    BNP 1,987 (H) 0 - 46 pg/mL   Extra Blue Top Tube   Result Value Ref Range    Hold Specimen JIC    Extra Red Top Tube   Result Value Ref Range    Hold Specimen JIC    Extra Green Top (Lithium Heparin) Tube   Result Value Ref Range    Hold Specimen JIC    Extra Purple Top Tube   Result Value Ref Range    Hold Specimen JIC    ECG  12-LEAD WITH MUSE (LHE)   Result Value Ref Range    Systolic Blood Pressure 153 mmHg    Diastolic Blood Pressure 110 mmHg    Ventricular Rate 94 BPM    Atrial Rate 94 BPM    LA Interval 132 ms    QRS Duration 104 ms     ms    QTc 492 ms    P Axis 49 degrees    R AXIS -24 degrees    T Axis -17 degrees    Interpretation ECG       Sinus rhythm with occasional Premature ventricular complexes  Minimal voltage criteria for LVH, may be normal variant  Nonspecific T wave abnormality  Prolonged QT  Abnormal ECG  No previous ECGs available  Confirmed by SEE ED PROVIDER NOTE FOR, ECG INTERPRETATION ([id number]),  MOUSTAPHA MORRELL ([id number]) on 3/20/2022 3:03:58 AM             Pertinent Radiology

## 2022-03-21 ENCOUNTER — TELEPHONE (OUTPATIENT)
Dept: CARDIOLOGY | Facility: CLINIC | Age: 57
End: 2022-03-21
Payer: COMMERCIAL

## 2022-03-21 ENCOUNTER — TELEPHONE (OUTPATIENT)
Dept: CARDIOLOGY | Facility: CLINIC | Age: 57
End: 2022-03-21

## 2022-03-21 ENCOUNTER — PATIENT OUTREACH (OUTPATIENT)
Dept: CARE COORDINATION | Facility: CLINIC | Age: 57
End: 2022-03-21
Payer: COMMERCIAL

## 2022-03-21 DIAGNOSIS — Z71.89 OTHER SPECIFIED COUNSELING: ICD-10-CM

## 2022-03-21 NOTE — TELEPHONE ENCOUNTER
[facility] Call Center    Phone Message    May a detailed message be left on voicemail: yes     Reason for Call: Appointment Intake    Referring Provider Name: Damian Alexander  Diagnosis and/or Symptoms: new heart failure, patient is to been seen in 7-10 days. No openings. Please call patient with a appt date and time.     Action Taken: Message routed to:  [facility] ([facility]): Cardio     Travel Screening: Not Applicable

## 2022-03-21 NOTE — PROGRESS NOTES
"Clinic Care Coordination Contact  [facility]: Post-Discharge Note  SITUATION                                                      Admission:    Admission Date: 03/20/22   Reason for Admission: Chest Pain, Fatigue, Leg Swelling  Discharge:   Discharge Date: 03/20/22  Discharge Diagnosis: Acute systolic congestive heart failure (H) +1 more    BACKGROUND                                                      Per hospital discharge summary and inpatient provider notes:    56-year-old male with history of hypertension and asthma who presents with chest pain, leg swelling.  Patient was seen 3/6 at [facility] for chest pain and found to have an NSTEMI but he left the emergency room AMA.  He states he continues to have exertional left shoulder pain and occasional twinges of pain in his chest.  He is not currently having any pain.  He has also had increased leg swelling and shortness of breath.  On exam he has bilateral lower extremity swelling, left greater than right.  He is otherwise nontoxic-appearing and satting well on room air.  Differential includes but not limited to ACS, PE, dissection, pericarditis, myocarditis, CHF, pneumonia, among others.    ASSESSMENT      Enrollment  Primary Care Care Coordination Status: Not a Candidate    Discharge Assessment  How are you doing now that you are home?: \"I'm good I feel pretty good\"  How are your symptoms? (Red Flag symptoms escalate to triage hotline per guidelines): Improved  Do you feel your condition is stable enough to be safe at home until your provider visit?: Yes  Does the patient have their discharge instructions? : Yes  Does the patient have questions regarding their discharge instructions? : No  Were you started on any new medications or were there changes to any of your previous medications? : Yes  Does the patient have all of their medications?: Yes  Do you have questions regarding any of your medications? : No  Do you have all of your needed medical supplies or " equipment (DME)?  (i.e. oxygen tank, CPAP, cane, etc.): Yes  Discharge follow-up appointment scheduled within 14 calendar days? : Yes  Discharge Follow Up Appointment Date: 03/24/22  Discharge Follow Up Appointment Scheduled with?: Primary Care Provider    Post-op ([facility] Only)  If the patient had a surgery or procedure, do they have any questions for a nurse?: No      PLAN                                                      Outpatient Plan:      No plan stated in patients chart or AVS.    Future Appointments   Date Time Provider Department Center   3/31/2022  1:50 PM [facility] Nix MD [facility]         For any urgent concerns, please contact our 24 hour nurse triage line: [phone])         ALIS Garcia  [phone]  Connected Care Resource [facility]

## 2022-03-21 NOTE — TELEPHONE ENCOUNTER
Received call from call center, pt looking to schedule angio.  Orders in from Dr Nix for CA poss PCI.  Note sent to procedure .  -[email or web address]

## 2022-03-21 NOTE — TELEPHONE ENCOUNTER
----- Message from Carole Sheikh sent at 3/21/2022 11:27 AM CDT -----  Regarding: RE: CA poss PCI  CORS POSS PCI WITH PTK/EMG     630AM ADMIT ON 3/24    H&P ON 3/20 WITH YUMIKO     NO ALERTS     NEGATIVE COVID TEST ON 3/20    THANKS!   -CAROLE  ----- Message -----  From: Sierra Leavitt RN  Sent: 3/21/2022  10:13 AM CDT  To: [facility] Procedure  Pool - [facility]  Subject: CA poss PCI                                      Dr Nix put in orders for CA poss PCI.  H&P would be 3/20/22.    No alerts or previous history.    Thank you,    Sierra Everett, RN

## 2022-03-23 ENCOUNTER — PREP FOR PROCEDURE (OUTPATIENT)
Dept: CARDIOLOGY | Facility: CLINIC | Age: 57
End: 2022-03-23
Payer: COMMERCIAL

## 2022-03-23 DIAGNOSIS — I42.9 CARDIOMYOPATHY, UNSPECIFIED TYPE (H): ICD-10-CM

## 2022-03-23 DIAGNOSIS — F15.10 METHAMPHETAMINE ABUSE (H): ICD-10-CM

## 2022-03-23 DIAGNOSIS — R06.02 SHORTNESS OF BREATH: ICD-10-CM

## 2022-03-23 DIAGNOSIS — I21.4 NSTEMI (NON-ST ELEVATED MYOCARDIAL INFARCTION) (H): Primary | ICD-10-CM

## 2022-03-23 RX ORDER — ASPIRIN 81 MG/1
243 TABLET, CHEWABLE ORAL ONCE
Status: CANCELLED | OUTPATIENT
Start: 2022-03-24

## 2022-03-23 RX ORDER — DIAZEPAM 5 MG
10 TABLET ORAL
Status: CANCELLED | OUTPATIENT
Start: 2022-03-24

## 2022-03-23 RX ORDER — ASPIRIN 325 MG
325 TABLET ORAL ONCE
Status: CANCELLED | OUTPATIENT
Start: 2022-03-24 | End: 2022-03-23

## 2022-03-23 RX ORDER — LIDOCAINE 40 MG/G
CREAM TOPICAL
Status: CANCELLED | OUTPATIENT
Start: 2022-03-23

## 2022-03-23 RX ORDER — FENTANYL CITRATE 50 UG/ML
25 INJECTION, SOLUTION INTRAMUSCULAR; INTRAVENOUS
Status: CANCELLED | OUTPATIENT
Start: 2022-03-24

## 2022-03-23 RX ORDER — SODIUM CHLORIDE 9 MG/ML
INJECTION, SOLUTION INTRAVENOUS CONTINUOUS
Status: CANCELLED | OUTPATIENT
Start: 2022-03-24

## 2022-03-23 NOTE — TELEPHONE ENCOUNTER
3rd voicemail left for pt:  Instructed pt to check in at [facility] at 6:30 AM, Thursday 3/24/22.    Instructed pt to have nothing to eat or drink after midnight tonight, and to take only carvedilol and 4 baby aspirin in the morning with small sip of water.  Asked for call back to [phone] to confirm message received.  elia

## 2022-03-23 NOTE — TELEPHONE ENCOUNTER
Kvng Vann  [address]  [place] [address]  [phone] (home)     PCP:  Faustino [facility]  H&P completed by:  Dr Nix 3/20/22  Admit date       4/4/22   Arrival time:       8:00 AM  Anticoagulation:  NA  Previous PCI: No  Bypass Grafts: No  Renal Issues: No  Diabetic?: No  Device?: No    Ambulation status: independent    Reason for Visit:  Telephone call to discuss pre-procedure education in preparation for: Coronary Angiogram with Possible PCI    Procedure Prep:  EKG results obtained, dated: To be completed on day of cath lab procedure  Hemogram results obtained: To be completed on day of cath lab procedure  Basic Metabolic Panel results obtained: To be completed on day of cath lab procedure  Pertinent cardiac test results: 3/20/22 echo  COVID-19 test results obtained: Completed within [place]     Patient Education    1. Your arrival time is    8:00 AM  Location is [address] [address] - Main Entrance of the Hospital  2. Please plan on being at the hospital all day.  3. At any time, emergencies and/or urgent cases may come up which could delay the start of your procedure.    COVID Testing Instructions  *Mandatory COVID Testing:   ALL Patients will need to complete a COVID test no sooner than 4 days prior to their procedure (regardless of vaccination status).      To schedule COVID testing Please call [phone]    If you want to complete this at an outside facility please call them to find out if they will have the results within the appropriate time frame and their fax number.  You will need to provide us with that information so we can send the order.    The facility completing the test will need to fax the results to [phone]    If you are running into and issues please call us.     Pre-procedure instructions  Take your temperature in the morning prior to coming in.  If your temperature is 100 F please call [facility] [phone] and notify them.  If you do not have access to a thermometer at home, please come in for testing.  If you are running a temperature your procedure may be rescheduled.  Patient instructed to not Eat or Drink after midnight.  Patient instructed to shower the evening before or the morning of the procedure.  Patient instructed to arrange for transportation home following procedure from a responsible family member or friend. No driving for at least 24 hours.  Patient instructed to have a responsible adult with them for 24 hours post-procedure.  Post-procedure follow up process.  Conscious sedation discussed.      Pre-procedure medication instructions.  Continue medications as scheduled, with a small amount of water on the day of the procedure unless indicated. (NO Diabetic Medications or Blood Thinners)  Pt instructed not to consume Alcohol, Tobacco, Caffeine, or Carbonated beverages 12 hours prior to procedure.  Patient instructed to take 324 mg of Aspirin the morning of procedure: Yes  Other medication: instructed to only take carvedilol a.m. of the procedure.    Patient states understanding of procedure and agrees to proceed.    *PATIENTS RECORDS AVAILABLE IN [facility] UNLESS OTHERWISE INDICATED*      Patient Active Problem List   Diagnosis     Shortness of breath       Current Outpatient Medications   Medication Sig Dispense Refill     amitriptyline (ELAVIL) 100 MG tablet Take 100 mg by mouth At Bedtime       aspirin (ASA) 81 MG EC tablet Take 81 mg by mouth daily       carvedilol (COREG) 3.125 MG tablet Take 1 tablet (3.125 mg) by mouth 2 times daily (with meals) 60 tablet 1     furosemide (LASIX) 40 MG tablet Take 1 tablet (40 mg) by mouth daily 30 tablet 1     ibuprofen (ADVIL/MOTRIN) 200 MG capsule Take 400 mg by mouth every 4 hours as needed for mild pain or fever       lisinopril (ZESTRIL) 5 MG tablet Take 1 tablet (5 mg) by mouth daily 30 tablet 1     spironolactone (ALDACTONE) 25 MG tablet Take  0.5 tablets (12.5 mg) by mouth daily 30 tablet 1       No Known Allergies    Sierra Leavitt RN on 3/23/2022 at 10:30 AM

## 2022-03-24 ENCOUNTER — TELEPHONE (OUTPATIENT)
Facility: CLINIC | Age: 57
End: 2022-03-24
Payer: COMMERCIAL

## 2022-03-24 DIAGNOSIS — Z11.59 ENCOUNTER FOR SCREENING FOR OTHER VIRAL DISEASES: Primary | ICD-10-CM

## 2022-03-24 NOTE — TELEPHONE ENCOUNTER
Education updated.  -estefanía    ----- Message -----  From: Carole Sheikh  Sent: 3/24/2022   2:41 PM CDT  To: JEANMARIE Dan,     This pt missed his angio today because his ride never showed up. I reached out to him and rescheduled it for Monday, 3/28. His covid test is scheduled for tomorrow as well. Let me know if you have any questions.     CORS POSS PCI WITH EMG/MY     10AM ADMIT ON 3/28    H&P ON 3/20 WITH YUMIKO     NO ALERTS     COVID TESTING ON 3/25 IN [facility]     THANKS!   -CAROLE

## 2022-03-24 NOTE — TELEPHONE ENCOUNTER
[facility] Call Center    Phone Message    May a detailed message be left on voicemail: yes     Reason for Call: Appointment Intake    Referring Provider Name: Tay  Diagnosis and/or Symptoms: pt was not able to get a ride to his appt today for the Coronary Angiogram - please call to reschedule    Action Taken: Message routed to:  [facility] cardio    Travel Screening: Not Applicable

## 2022-03-25 ENCOUNTER — LAB (OUTPATIENT)
Dept: LAB | Facility: CLINIC | Age: 57
End: 2022-03-25
Payer: COMMERCIAL

## 2022-03-25 DIAGNOSIS — Z11.59 ENCOUNTER FOR SCREENING FOR OTHER VIRAL DISEASES: ICD-10-CM

## 2022-03-25 LAB — SARS-COV-2 RNA RESP QL NAA+PROBE: NEGATIVE

## 2022-03-25 PROCEDURE — U0003 INFECTIOUS AGENT DETECTION BY NUCLEIC ACID (DNA OR RNA); SEVERE ACUTE RESPIRATORY SYNDROME CORONAVIRUS 2 (SARS-COV-2) (CORONAVIRUS DISEASE [COVID-19]), AMPLIFIED PROBE TECHNIQUE, MAKING USE OF HIGH THROUGHPUT TECHNOLOGIES AS DESCRIBED BY CMS-2020-01-R: HCPCS

## 2022-03-25 PROCEDURE — U0005 INFEC AGEN DETEC AMPLI PROBE: HCPCS

## 2022-03-25 NOTE — TELEPHONE ENCOUNTER
Able to reach pt to go over instructions.  Pt verbalized understanding and had no questions at this time.  Pt has my direct phone # for any questions.  -estefanía

## 2022-03-28 NOTE — PROGRESS NOTES
Pt called and stated he had a flat tire and would be late coming in for coronary angiogram.  Arrival time was supposed to be 10 am.  1115 am pt called and he is still waiting for his tire to be fixed. Pt unsure when he would be able to arrive.  Talked to cath lab and instructed to have him reschedule.  Instructed pt to rescheduled. Pt denies having any pain at this time. Instructed pt to go to the ER if he does start having chest pain.      Evelyne De La Rosa RN

## 2022-03-28 NOTE — TELEPHONE ENCOUNTER
No show.  -raopal    ==View-only below this line===  ----- Message -----  From: Carole Sheikh  Sent: 3/28/2022   2:47 PM CDT  To: JEANMARIE Dan     Pt called back and reschedule his angio for tomorrow. The updated info is below, let me know if you have any questions.       CORS POSS PCI WITH EMG/PTK     730AM ADMIT ON 3/29    H&P ON 3/20 WITH YUMIKO     NO ALERTS     COVID TESTING ON 3/25 IN [facility]     THANKS!   -CAROLE

## 2022-03-29 ENCOUNTER — TELEPHONE (OUTPATIENT)
Dept: CARDIOLOGY | Facility: CLINIC | Age: 57
End: 2022-03-29
Payer: COMMERCIAL

## 2022-03-29 DIAGNOSIS — R06.02 SHORTNESS OF BREATH: Primary | ICD-10-CM

## 2022-03-29 DIAGNOSIS — I42.9 CARDIOMYOPATHY, UNSPECIFIED TYPE (H): ICD-10-CM

## 2022-03-29 NOTE — PROGRESS NOTES
Pt scheduled to arrive in [facility] at 0730 for coronary angiogram. Pt not here, pt called no answer.

## 2022-03-29 NOTE — TELEPHONE ENCOUNTER
Follow-up order placed for Dr Pickard.  Message sent to [facility] for rescheduling angio.  -ra    ===View-only below this line===  ----- Message -----  From: Jaden Nix MD  Sent: 3/29/2022   4:32 PM CDT  To: Sierra Leavitt RN    Sierra Everett,    I have called the patient and discussed the angiogram with possible PCI with the patient.  The patient states that he has some intermittent chest pain.  I advised him to go to [facility] emergency room if he has chest pain again.    He requested to give him another chance to do angiogram with possible PCI.  Please explain to the Cath Lab schedule and then reschedule him for coronary angiogram with PCI again.    In addition, please schedule the patient with Dr. Pickard for advanced congestive heart failure.    Thanks  Robert

## 2022-03-29 NOTE — TELEPHONE ENCOUNTER
Pt called back to reschedule.  Informed pt we are unable to reschedule at this time because he did not show up the past 3 times he was scheduled.  Advised pt to follow-up with Dr Nix as scheduled on 3/31/22 to discuss.    Pt then went on to report he can not wait, and he needs it done because he is having chest pain this morning.  Advised pt to go to ED with chest pain and procedure may be done urgently.  Pt asked for Dr Nix's phone #, phone # was not given to pt.  Informed pt that a message had been sent to provider earlier.  Pt again reports chest pain - again advised pt to go to ED.  Pt stated he is not going to ED again and hung up.    Dr Nix - any new recommendations?  -[facility]

## 2022-03-29 NOTE — TELEPHONE ENCOUNTER
[facility] for pt to call [phone] to discuss.   Message sent to Dr Nix as well.  Pt is scheduled with Dr Nix 3/31/22.  -estefanía    [2:44 PM] Zunilda Sheikh  Kvng Vann is calling anyone and everyone today to reschedule his procedure...including Blanca Ham. She transferred his call to my .     [2:45 PM] Sierra Leavitt  I will call him now.    [2:45 PM] Zunilda Sheikh  He's desperately trying to reschedule his procedure....       ===View-only below this line===  ----- Message -----  From: Gilda Duncan  Sent: 3/29/2022  10:48 AM CDT  To: Sierra Leavitt RN, Tracey Calderon  Subject: LEN ORDERING                                      Hi Sierra Everett,     I am sure you are aware, but this patient has now no showed to his procedure three times now: on 3/24, 3/28 and 3/29. Cath lab leadership has asked us not to reschedule the procedure until the ordering provider speaks with him and we are sure he will show up.     Can you get back to us if the patient is going to proceed, or if we can cancel the case request?    Thank you!   Caitlin

## 2022-03-29 NOTE — TELEPHONE ENCOUNTER
[facility] Call Center    Phone Message    May a detailed message be left on voicemail: yes     Reason for Call: Other: Pt would like a call back as he thought his angio was tomorrow and missed it and would like a call back to reschedule     Action Taken: Message routed to:  [facility] Cardio    Travel Screening: Not Applicable

## 2022-03-30 DIAGNOSIS — Z11.59 ENCOUNTER FOR SCREENING FOR OTHER VIRAL DISEASES: Primary | ICD-10-CM

## 2022-03-30 NOTE — TELEPHONE ENCOUNTER
[facility] Call Center    Phone Message    May a detailed message be left on voicemail: no     Reason for Call: Other: Kvng called to schedule an angiogram, please reach out to him at [phone].     Action Taken: Other: [facility]    Travel Screening: Not Applicable

## 2022-03-30 NOTE — TELEPHONE ENCOUNTER
===View-only below this line===  ----- Message -----  From: Carole Sheikh  Sent: 3/30/2022   9:48 AM CDT  To: Sierra Leavitt RN    CORS POSS PCI WITH MY/MA     8AM ADMIT ON 4/4    H&P ON 3/20 WITH YUMIKO     NO ALERTS     COVID TESTING ON 3/31 IN [facility]     **PT CONFIRMED VIA PHONE CALL THAT HE WILL MAKE IT TO HIS PROCEDURE THIS TIME**     THANKS!   -CAROLE

## 2022-03-31 ENCOUNTER — LAB (OUTPATIENT)
Dept: LAB | Facility: CLINIC | Age: 57
End: 2022-03-31
Payer: COMMERCIAL

## 2022-03-31 DIAGNOSIS — Z11.59 ENCOUNTER FOR SCREENING FOR OTHER VIRAL DISEASES: ICD-10-CM

## 2022-03-31 LAB — SARS-COV-2 RNA RESP QL NAA+PROBE: NEGATIVE

## 2022-03-31 PROCEDURE — U0003 INFECTIOUS AGENT DETECTION BY NUCLEIC ACID (DNA OR RNA); SEVERE ACUTE RESPIRATORY SYNDROME CORONAVIRUS 2 (SARS-COV-2) (CORONAVIRUS DISEASE [COVID-19]), AMPLIFIED PROBE TECHNIQUE, MAKING USE OF HIGH THROUGHPUT TECHNOLOGIES AS DESCRIBED BY CMS-2020-01-R: HCPCS

## 2022-03-31 PROCEDURE — U0005 INFEC AGEN DETEC AMPLI PROBE: HCPCS

## 2022-04-01 NOTE — TELEPHONE ENCOUNTER
[facility] for pt - looking to confirm date and time for CA poss PCI 4/4/22 8:00, and to see if he had any questions.  Asked for call back to [phone].  -cinthya

## 2022-04-04 ENCOUNTER — HOSPITAL ENCOUNTER (OUTPATIENT)
Facility: HOSPITAL | Age: 57
Discharge: HOME | End: 2022-04-04
Attending: INTERNAL MEDICINE | Admitting: INTERNAL MEDICINE
Payer: COMMERCIAL

## 2022-04-04 VITALS
OXYGEN SATURATION: 99 % | TEMPERATURE: 98.1 F | RESPIRATION RATE: 20 BRPM | SYSTOLIC BLOOD PRESSURE: 103 MMHG | HEART RATE: 70 BPM | WEIGHT: 180 LBS | HEIGHT: 71 IN | BODY MASS INDEX: 25.2 KG/M2 | DIASTOLIC BLOOD PRESSURE: 58 MMHG

## 2022-04-04 DIAGNOSIS — F15.10 METHAMPHETAMINE ABUSE (H): ICD-10-CM

## 2022-04-04 DIAGNOSIS — I21.4 NSTEMI (NON-ST ELEVATED MYOCARDIAL INFARCTION) (H): ICD-10-CM

## 2022-04-04 DIAGNOSIS — I50.21 ACUTE SYSTOLIC CONGESTIVE HEART FAILURE (H): ICD-10-CM

## 2022-04-04 DIAGNOSIS — R06.02 SHORTNESS OF BREATH: ICD-10-CM

## 2022-04-04 DIAGNOSIS — I25.110 CORONARY ARTERY DISEASE INVOLVING NATIVE CORONARY ARTERY OF NATIVE HEART WITH UNSTABLE ANGINA PECTORIS (H): Primary | ICD-10-CM

## 2022-04-04 DIAGNOSIS — I42.9 CARDIOMYOPATHY, UNSPECIFIED TYPE (H): ICD-10-CM

## 2022-04-04 LAB
ABO/RH(D): NORMAL
ACT BLD: 169 SECONDS (ref 74–150)
ACT BLD: 271 SECONDS (ref 74–150)
ANION GAP SERPL CALCULATED.3IONS-SCNC: 10 MMOL/L (ref 5–18)
ANTIBODY SCREEN: NEGATIVE
ATRIAL RATE - MUSE: 65 BPM
ATRIAL RATE - MUSE: 77 BPM
BUN SERPL-MCNC: 23 MG/DL (ref 8–22)
CALCIUM SERPL-MCNC: 9 MG/DL (ref 8.5–10.5)
CHLORIDE BLD-SCNC: 103 MMOL/L (ref 98–107)
CHOLEST SERPL-MCNC: 190 MG/DL
CO2 SERPL-SCNC: 26 MMOL/L (ref 22–31)
CREAT SERPL-MCNC: 1.28 MG/DL (ref 0.7–1.3)
DIASTOLIC BLOOD PRESSURE - MUSE: NORMAL MMHG
DIASTOLIC BLOOD PRESSURE - MUSE: NORMAL MMHG
ERYTHROCYTE [DISTWIDTH] IN BLOOD BY AUTOMATED COUNT: 12.8 % (ref 10–15)
FASTING STATUS PATIENT QL REPORTED: YES
GFR SERPL CREATININE-BSD FRML MDRD: 66 ML/MIN/1.73M2
GLUCOSE BLD-MCNC: 84 MG/DL (ref 70–125)
HCT VFR BLD AUTO: 45.7 % (ref 40–53)
HDLC SERPL-MCNC: 52 MG/DL
HGB BLD-MCNC: 15.1 G/DL (ref 13.3–17.7)
INTERPRETATION ECG - MUSE: NORMAL
INTERPRETATION ECG - MUSE: NORMAL
LDLC SERPL CALC-MCNC: 116 MG/DL
MCH RBC QN AUTO: 30.3 PG (ref 26.5–33)
MCHC RBC AUTO-ENTMCNC: 33 G/DL (ref 31.5–36.5)
MCV RBC AUTO: 92 FL (ref 78–100)
P AXIS - MUSE: 45 DEGREES
P AXIS - MUSE: 51 DEGREES
PLATELET # BLD AUTO: 242 10E3/UL (ref 150–450)
POTASSIUM BLD-SCNC: 3.9 MMOL/L (ref 3.5–5)
PR INTERVAL - MUSE: 142 MS
PR INTERVAL - MUSE: 150 MS
QRS DURATION - MUSE: 104 MS
QRS DURATION - MUSE: 108 MS
QT - MUSE: 424 MS
QT - MUSE: 452 MS
QTC - MUSE: 470 MS
QTC - MUSE: 479 MS
R AXIS - MUSE: -10 DEGREES
R AXIS - MUSE: -13 DEGREES
RBC # BLD AUTO: 4.99 10E6/UL (ref 4.4–5.9)
SODIUM SERPL-SCNC: 139 MMOL/L (ref 136–145)
SPECIMEN EXPIRATION DATE: NORMAL
SYSTOLIC BLOOD PRESSURE - MUSE: NORMAL MMHG
SYSTOLIC BLOOD PRESSURE - MUSE: NORMAL MMHG
T AXIS - MUSE: -63 DEGREES
T AXIS - MUSE: -77 DEGREES
TRIGL SERPL-MCNC: 112 MG/DL
VENTRICULAR RATE- MUSE: 65 BPM
VENTRICULAR RATE- MUSE: 77 BPM
WBC # BLD AUTO: 6.4 10E3/UL (ref 4–11)

## 2022-04-04 PROCEDURE — 99152 MOD SED SAME PHYS/QHP 5/>YRS: CPT | Performed by: INTERNAL MEDICINE

## 2022-04-04 PROCEDURE — 999N000054 HC STATISTIC EKG NON-CHARGEABLE

## 2022-04-04 PROCEDURE — 36415 COLL VENOUS BLD VENIPUNCTURE: CPT | Performed by: INTERNAL MEDICINE

## 2022-04-04 PROCEDURE — 250N000013 HC RX MED GY IP 250 OP 250 PS 637: Performed by: INTERNAL MEDICINE

## 2022-04-04 PROCEDURE — 258N000003 HC RX IP 258 OP 636: Performed by: INTERNAL MEDICINE

## 2022-04-04 PROCEDURE — 80061 LIPID PANEL: CPT | Performed by: INTERNAL MEDICINE

## 2022-04-04 PROCEDURE — 93010 ELECTROCARDIOGRAM REPORT: CPT | Performed by: INTERNAL MEDICINE

## 2022-04-04 PROCEDURE — 272N000001 HC OR GENERAL SUPPLY STERILE: Performed by: INTERNAL MEDICINE

## 2022-04-04 PROCEDURE — 85347 COAGULATION TIME ACTIVATED: CPT

## 2022-04-04 PROCEDURE — C1725 CATH, TRANSLUMIN NON-LASER: HCPCS | Performed by: INTERNAL MEDICINE

## 2022-04-04 PROCEDURE — 80048 BASIC METABOLIC PNL TOTAL CA: CPT | Performed by: INTERNAL MEDICINE

## 2022-04-04 PROCEDURE — C1894 INTRO/SHEATH, NON-LASER: HCPCS | Performed by: INTERNAL MEDICINE

## 2022-04-04 PROCEDURE — 255N000002 HC RX 255 OP 636: Performed by: INTERNAL MEDICINE

## 2022-04-04 PROCEDURE — 93005 ELECTROCARDIOGRAM TRACING: CPT

## 2022-04-04 PROCEDURE — C9600 PERC DRUG-EL COR STENT SING: HCPCS | Performed by: INTERNAL MEDICINE

## 2022-04-04 PROCEDURE — 92928 PRQ TCAT PLMT NTRAC ST 1 LES: CPT | Mod: RC | Performed by: INTERNAL MEDICINE

## 2022-04-04 PROCEDURE — 93010 ELECTROCARDIOGRAM REPORT: CPT | Mod: 76 | Performed by: INTERNAL MEDICINE

## 2022-04-04 PROCEDURE — C1769 GUIDE WIRE: HCPCS | Performed by: INTERNAL MEDICINE

## 2022-04-04 PROCEDURE — C1874 STENT, COATED/COV W/DEL SYS: HCPCS | Performed by: INTERNAL MEDICINE

## 2022-04-04 PROCEDURE — 93454 CORONARY ARTERY ANGIO S&I: CPT | Mod: 26 | Performed by: INTERNAL MEDICINE

## 2022-04-04 PROCEDURE — 99153 MOD SED SAME PHYS/QHP EA: CPT | Performed by: INTERNAL MEDICINE

## 2022-04-04 PROCEDURE — 93454 CORONARY ARTERY ANGIO S&I: CPT | Performed by: INTERNAL MEDICINE

## 2022-04-04 PROCEDURE — C1887 CATHETER, GUIDING: HCPCS | Performed by: INTERNAL MEDICINE

## 2022-04-04 PROCEDURE — 250N000009 HC RX 250: Performed by: INTERNAL MEDICINE

## 2022-04-04 PROCEDURE — 250N000011 HC RX IP 250 OP 636: Performed by: INTERNAL MEDICINE

## 2022-04-04 PROCEDURE — 85027 COMPLETE CBC AUTOMATED: CPT | Performed by: INTERNAL MEDICINE

## 2022-04-04 PROCEDURE — 86901 BLOOD TYPING SEROLOGIC RH(D): CPT | Performed by: INTERNAL MEDICINE

## 2022-04-04 DEVICE — STENT CORONARY DES SYNERGY XD MR US 3.00X38MM H7493941838300: Type: IMPLANTABLE DEVICE | Status: FUNCTIONAL

## 2022-04-04 RX ORDER — LIDOCAINE 40 MG/G
CREAM TOPICAL
Status: DISCONTINUED | OUTPATIENT
Start: 2022-04-04 | End: 2022-04-04 | Stop reason: HOSPADM

## 2022-04-04 RX ORDER — NITROGLYCERIN 0.4 MG/1
TABLET SUBLINGUAL
Status: DISCONTINUED | OUTPATIENT
Start: 2022-04-04 | End: 2022-04-04 | Stop reason: HOSPADM

## 2022-04-04 RX ORDER — SODIUM CHLORIDE 9 MG/ML
INJECTION, SOLUTION INTRAVENOUS CONTINUOUS
Status: ACTIVE | OUTPATIENT
Start: 2022-04-04 | End: 2022-04-04

## 2022-04-04 RX ORDER — NITROGLYCERIN 5 MG/ML
VIAL (ML) INTRAVENOUS
Status: DISCONTINUED | OUTPATIENT
Start: 2022-04-04 | End: 2022-04-04 | Stop reason: HOSPADM

## 2022-04-04 RX ORDER — ASPIRIN 325 MG
325 TABLET ORAL ONCE
Status: COMPLETED | OUTPATIENT
Start: 2022-04-04 | End: 2022-04-04

## 2022-04-04 RX ORDER — ATROPINE SULFATE 0.1 MG/ML
0.5 INJECTION INTRAVENOUS
Status: DISCONTINUED | OUTPATIENT
Start: 2022-04-04 | End: 2022-04-04 | Stop reason: HOSPADM

## 2022-04-04 RX ORDER — NALOXONE HYDROCHLORIDE 0.4 MG/ML
0.4 INJECTION, SOLUTION INTRAMUSCULAR; INTRAVENOUS; SUBCUTANEOUS
Status: DISCONTINUED | OUTPATIENT
Start: 2022-04-04 | End: 2022-04-04 | Stop reason: HOSPADM

## 2022-04-04 RX ORDER — CLOPIDOGREL BISULFATE 75 MG/1
75 TABLET ORAL DAILY
Qty: 90 TABLET | Refills: 3 | Status: SHIPPED | OUTPATIENT
Start: 2022-04-05 | End: 2023-06-27

## 2022-04-04 RX ORDER — IODIXANOL 320 MG/ML
INJECTION, SOLUTION INTRAVASCULAR
Status: DISCONTINUED | OUTPATIENT
Start: 2022-04-04 | End: 2022-04-04 | Stop reason: HOSPADM

## 2022-04-04 RX ORDER — HEPARIN SODIUM 1000 [USP'U]/ML
INJECTION, SOLUTION INTRAVENOUS; SUBCUTANEOUS
Status: DISCONTINUED | OUTPATIENT
Start: 2022-04-04 | End: 2022-04-04 | Stop reason: HOSPADM

## 2022-04-04 RX ORDER — SODIUM CHLORIDE 9 MG/ML
INJECTION, SOLUTION INTRAVENOUS CONTINUOUS
Status: DISCONTINUED | OUTPATIENT
Start: 2022-04-04 | End: 2022-04-04 | Stop reason: HOSPADM

## 2022-04-04 RX ORDER — ASPIRIN 81 MG/1
81 TABLET ORAL DAILY
Status: DISCONTINUED | OUTPATIENT
Start: 2022-04-05 | End: 2022-04-04 | Stop reason: HOSPADM

## 2022-04-04 RX ORDER — FENTANYL CITRATE 50 UG/ML
25 INJECTION, SOLUTION INTRAMUSCULAR; INTRAVENOUS
Status: DISCONTINUED | OUTPATIENT
Start: 2022-04-04 | End: 2022-04-04 | Stop reason: HOSPADM

## 2022-04-04 RX ORDER — CLOPIDOGREL BISULFATE 75 MG/1
75 TABLET ORAL DAILY
Status: DISCONTINUED | OUTPATIENT
Start: 2022-04-05 | End: 2022-04-04 | Stop reason: HOSPADM

## 2022-04-04 RX ORDER — ROSUVASTATIN CALCIUM 40 MG/1
40 TABLET, COATED ORAL DAILY
Status: DISCONTINUED | OUTPATIENT
Start: 2022-04-04 | End: 2022-04-04 | Stop reason: HOSPADM

## 2022-04-04 RX ORDER — DIAZEPAM 5 MG
10 TABLET ORAL
Status: COMPLETED | OUTPATIENT
Start: 2022-04-04 | End: 2022-04-04

## 2022-04-04 RX ORDER — OXYCODONE HYDROCHLORIDE 5 MG/1
10 TABLET ORAL EVERY 4 HOURS PRN
Status: DISCONTINUED | OUTPATIENT
Start: 2022-04-04 | End: 2022-04-04 | Stop reason: HOSPADM

## 2022-04-04 RX ORDER — ASPIRIN 81 MG/1
81 TABLET, CHEWABLE ORAL DAILY
Qty: 30 TABLET | Refills: 3 | Status: SHIPPED | OUTPATIENT
Start: 2022-04-04 | End: 2022-04-04

## 2022-04-04 RX ORDER — HYDRALAZINE HYDROCHLORIDE 20 MG/ML
10 INJECTION INTRAMUSCULAR; INTRAVENOUS EVERY 4 HOURS PRN
Status: DISCONTINUED | OUTPATIENT
Start: 2022-04-04 | End: 2022-04-04 | Stop reason: HOSPADM

## 2022-04-04 RX ORDER — OXYCODONE HYDROCHLORIDE 5 MG/1
5 TABLET ORAL EVERY 4 HOURS PRN
Status: DISCONTINUED | OUTPATIENT
Start: 2022-04-04 | End: 2022-04-04 | Stop reason: HOSPADM

## 2022-04-04 RX ORDER — CLOPIDOGREL BISULFATE 75 MG/1
TABLET ORAL
Status: DISCONTINUED | OUTPATIENT
Start: 2022-04-04 | End: 2022-04-04 | Stop reason: HOSPADM

## 2022-04-04 RX ORDER — FENTANYL CITRATE 50 UG/ML
INJECTION, SOLUTION INTRAMUSCULAR; INTRAVENOUS
Status: DISCONTINUED | OUTPATIENT
Start: 2022-04-04 | End: 2022-04-04 | Stop reason: HOSPADM

## 2022-04-04 RX ORDER — ASPIRIN 81 MG/1
81 TABLET, CHEWABLE ORAL ONCE
Status: DISCONTINUED | OUTPATIENT
Start: 2022-04-04 | End: 2022-04-04 | Stop reason: HOSPADM

## 2022-04-04 RX ORDER — ACETAMINOPHEN 325 MG/1
650 TABLET ORAL EVERY 4 HOURS PRN
Status: DISCONTINUED | OUTPATIENT
Start: 2022-04-04 | End: 2022-04-04 | Stop reason: HOSPADM

## 2022-04-04 RX ORDER — NALOXONE HYDROCHLORIDE 0.4 MG/ML
0.2 INJECTION, SOLUTION INTRAMUSCULAR; INTRAVENOUS; SUBCUTANEOUS
Status: DISCONTINUED | OUTPATIENT
Start: 2022-04-04 | End: 2022-04-04 | Stop reason: HOSPADM

## 2022-04-04 RX ORDER — ASPIRIN 81 MG/1
243 TABLET, CHEWABLE ORAL ONCE
Status: COMPLETED | OUTPATIENT
Start: 2022-04-04 | End: 2022-04-04

## 2022-04-04 RX ORDER — ROSUVASTATIN CALCIUM 40 MG/1
40 TABLET, COATED ORAL DAILY
Qty: 90 TABLET | Refills: 3 | Status: SHIPPED | OUTPATIENT
Start: 2022-04-04 | End: 2023-06-27

## 2022-04-04 RX ORDER — ONDANSETRON 4 MG/1
4 TABLET, ORALLY DISINTEGRATING ORAL EVERY 6 HOURS PRN
Status: DISCONTINUED | OUTPATIENT
Start: 2022-04-04 | End: 2022-04-04 | Stop reason: HOSPADM

## 2022-04-04 RX ORDER — FLUMAZENIL 0.1 MG/ML
0.2 INJECTION, SOLUTION INTRAVENOUS
Status: DISCONTINUED | OUTPATIENT
Start: 2022-04-04 | End: 2022-04-04 | Stop reason: HOSPADM

## 2022-04-04 RX ORDER — METOPROLOL TARTRATE 1 MG/ML
5 INJECTION, SOLUTION INTRAVENOUS
Status: DISCONTINUED | OUTPATIENT
Start: 2022-04-04 | End: 2022-04-04 | Stop reason: HOSPADM

## 2022-04-04 RX ORDER — NITROGLYCERIN 0.4 MG/1
0.4 TABLET SUBLINGUAL EVERY 5 MIN PRN
Status: DISCONTINUED | OUTPATIENT
Start: 2022-04-04 | End: 2022-04-04 | Stop reason: HOSPADM

## 2022-04-04 RX ORDER — ONDANSETRON 2 MG/ML
4 INJECTION INTRAMUSCULAR; INTRAVENOUS EVERY 6 HOURS PRN
Status: DISCONTINUED | OUTPATIENT
Start: 2022-04-04 | End: 2022-04-04 | Stop reason: HOSPADM

## 2022-04-04 RX ADMIN — DIAZEPAM 10 MG: 5 TABLET ORAL at 13:00

## 2022-04-04 RX ADMIN — SODIUM CHLORIDE: 9 INJECTION, SOLUTION INTRAVENOUS at 11:05

## 2022-04-04 RX ADMIN — ASPIRIN 325 MG: 325 TABLET ORAL at 11:04

## 2022-04-04 RX ADMIN — OXYCODONE HYDROCHLORIDE 10 MG: 5 TABLET ORAL at 16:15

## 2022-04-04 RX ADMIN — SODIUM CHLORIDE: 9 INJECTION, SOLUTION INTRAVENOUS at 13:00

## 2022-04-04 NOTE — Clinical Note
Stent deployed in the middle right coronary artery. Max pressure = 13 estefania. Total duration = 20 seconds.

## 2022-04-04 NOTE — INTERVAL H&P NOTE
"I have reviewed the surgical (or preoperative) H&P that is linked to this encounter, and examined the patient. There are no significant changes    Clinical Conditions Present on Arrival:  Clinically Significant Risk Factors Present on Admission                  # Platelet Defect: home medication list includes an antiplatelet medication   # Overweight: Estimated body mass index is 25.1 kg/m  as calculated from the following:    Height as of 3/20/22: 1.803 m (5' 11\").    Weight as of 3/20/22: 81.6 kg (180 lb).       "

## 2022-04-04 NOTE — DISCHARGE INSTRUCTIONS
Interventional Cardiology  Coronary Angiogram/Angioplasty/Stent/Atherectomy Discharge Instructions -   Femoral Approach/  Radial Approach    The instructions below are to help you understand how to take care of yourself. There is also information about when to call the doctor or emergency services    **Do not stop your aspirin or platelet inhibitor unless directed by your Cardiologist.  These medications help to prevent platelets in your blood from sticking together and forming a clot.  Examples of these medications are:  Ticagrelor (Brilinta), Clopidigrel (Plavix), Prasugrel (Effient)          For the first 72 hours after your procedure:    No driving for 24 hours    Do not lift more than 15 pounds.  If you usually lift 50 pounds or more daily, please contact your cardiologist    Avoid any hard work or tiring activities, this includes, yard work, jogging, biking, sexual activity    During the day get up and walk around every 2 hours    You may return to work after 72 hours if you are feeling well and your job does not involve heavy lifting          Groin Site / Wound Care / Bleeding      You may take off the dressing on your groin the day after your procedure    Keep the area dry and clean    It is ok to shower with regular soap.  Pat dry, do not rub    You do not need to use a bandage    No tub/pool or hot tub for 1 week    If your groin starts to bleed or begins to swell suddenly after leaving the hospital, lie flat and apply firm pressure above the site for 15 minutes.  If bleeding continues, call 9-1-1    Bruising around the groin area is normal.  It may take 2-3 weeks for this to go away.  It is normal for the bruised area to turn green and/or yellow as it is healing.  A small lump may also be present and may last 2-3 months.    Your leg may be sore or stiff for a few days.  You may take Tylenol or a pain medicine recommended by your doctor    If you have a fever over 100.4, that lasts more than one day - call  your cardiologist.          Our Cardiac Rehab staff may visit briefly with you while your in the hospital.  If they miss you, someone will contact you after you are home.  You are encouraged to enroll in an Outpatient Cardiac Rehab Program     ? No elective dental work for 6 weeks after having a stent.     ? No driving for 24 hours      Your Procedural Physician was: Dr. Robert Venegas  the phone number is: [phone]    Interventional Cardiology  Coronary Angiogram/Angioplasty/Stent/Atherectomy Discharge  Instructions -   Radial (wrist) Approach     The instructions below are to help you understand how to take care of yourself. There is also information about when to call the doctor or emergency services.    **Do not stop your aspirin or platelet inhibitor unless directed by your Cardiologist.  These medications help to prevent platelets in your blood from sticking together and forming a clot.   Examples of these medications are: Ticagrelor (Brilinta), Clopidogrel (Plavix), Prasugrel (Effient)    For 24 hours after procedure:    Do not subject hand/arm to any forceful movements for 24 hours, such as supporting weight when rising from a chair or bed.    Do not drive a car for 24 hours.    The dressing on the puncture site may be removed after 24 hours and left open to air. If minor oozing, you may apply a Band-Aid and remove after 12 hours.     You may shower on the day after your procedure. Do not take a tub bath or wash dishes (no soaking wrist) with the puncture site in water for 3 days after the procedure.    For 48 hours following the procedure:    Do not operate a lawnmower, motorcycle, chainsaw or all-terrain vehicle.    Do not lift anything heavier than 5-10 pounds with affected arm for 5 days.    Avoid excessive bending (flexion/extension) wrist movement.    Do not engage in vigorous exercise (i.e. tennis, golf) using the affected arm for 5 days after discharge.    You may return to work in 72 hours  if no complications and no heavy lifting.    If bleeding should occur following discharge:    Sit down and apply firm pressure with your thumb against the puncture site and fingers against back of wrist for 10 minutes.    If the bleeding stops, continue to rest, keeping your wrist still for 2 hours. Notify your doctor as soon as possible.    If bleeding does not stop after 10 minutes or if there is a large amount of bleeding or spurting, call 911 immediately. Do not drive yourself to the hospital.           Contact the Heart Clinic at [phone] if you develop:    Fever over 100.4, that lasts more than one day    Redness, heat, or pus at the puncture site    Change in color or temperature of the hand or arm    Expect mild tingling of hand and tenderness at the puncture site for up to 3 days. You may take Tylenol or a pain medicine recommended by your doctor.                       Our Cardiac Rehab staff may visit briefly with you while your in the hospital.   If they miss you, someone will contact you after you are home.  You are encouraged to enroll in an Outpatient Cardiac Rehab Program     No elective dental work for 6 weeks after having a stent    Your Procedural Physician was: Dr. Robert Venegas  the phone number is: [phone]    [facility]:  [phone]  If you are calling after hours, please listen to the entire voicemail, a live  will answer at the end of the message    [facility]:  [phone]  If you are calling after hours, please listen to the entire voicemail, a live  will answer at the end of the message

## 2022-04-04 NOTE — PRE-PROCEDURE
GENERAL PRE-PROCEDURE:   Procedure:  Coronary angiogram, possible percutaneous coronary intervention  Date/Time:  4/4/2022 8:27 AM    Written consent obtained?: Yes    Risks and benefits: Risks, benefits and alternatives were discussed    Consent given by:  Patient  Patient states understanding of procedure being performed: Yes    Patient's understanding of procedure matches consent: Yes    Procedure consent matches procedure scheduled: Yes    Expected level of sedation:  Moderate  Appropriately NPO:  Yes  ASA Class:  3 (HFrEF, moderate MR, hx of NSTEMI, history of methamphetamine abuse, family history of premature CAD)  Mallampati  :  Grade 1- soft palate, uvula, tonsillar pillars, and posterior pharyngeal wall visible  Lungs:  Lungs clear with good breath sounds bilaterally  Heart:  Normal heart sounds and rate  History & Physical reviewed:  History and physical reviewed and no updates needed  Statement of review:  I have reviewed the lab findings, diagnostic data, medications, and the plan for sedation

## 2022-04-04 NOTE — PROGRESS NOTES
Dr. Venegas paged to come to [facility] and talk to the patient. Pt wants to leave in two hours. Pt has a  sheath which needs to be removed and four hours post sheath removal Bed rest.

## 2022-04-04 NOTE — Clinical Note
The first balloon was inserted into the right coronary artery and middle right coronary artery.Max pressure = 13 estefania. Total duration = 20 seconds.

## 2022-04-05 NOTE — PROGRESS NOTES
Patient was kept comfortable during post-procedure stay.VSS. Denies pain. Right/femoral/radial access site remains dry & free from signs of bleeding. Appointments made & included in AVS. Dr. Venegas was able to speak with patient post procedure. Post-op instructions given to patient & spouse. Able to ask questions. Verbalized no concerns. Belongings returned. Discharged in stable condition.

## 2022-04-19 PROBLEM — I50.21 ACUTE SYSTOLIC CONGESTIVE HEART FAILURE (H): Status: ACTIVE | Noted: 2022-04-19

## 2022-04-19 PROBLEM — I25.5 ISCHEMIC CARDIOMYOPATHY: Status: ACTIVE | Noted: 2022-04-19

## 2022-04-19 PROBLEM — E78.5 DYSLIPIDEMIA, GOAL LDL BELOW 70: Status: ACTIVE | Noted: 2022-04-19

## 2022-04-21 ENCOUNTER — OFFICE VISIT (OUTPATIENT)
Dept: CARDIOLOGY | Facility: CLINIC | Age: 57
End: 2022-04-21
Payer: COMMERCIAL

## 2022-04-21 VITALS
RESPIRATION RATE: 16 BRPM | BODY MASS INDEX: 26.32 KG/M2 | DIASTOLIC BLOOD PRESSURE: 58 MMHG | HEIGHT: 71 IN | WEIGHT: 188 LBS | SYSTOLIC BLOOD PRESSURE: 90 MMHG | HEART RATE: 79 BPM

## 2022-04-21 DIAGNOSIS — R06.02 SHORTNESS OF BREATH: ICD-10-CM

## 2022-04-21 DIAGNOSIS — I42.9 CARDIOMYOPATHY, UNSPECIFIED TYPE (H): Primary | ICD-10-CM

## 2022-04-21 PROCEDURE — 99204 OFFICE O/P NEW MOD 45 MIN: CPT | Performed by: INTERNAL MEDICINE

## 2022-04-21 RX ORDER — METOPROLOL SUCCINATE 25 MG/1
25 TABLET, EXTENDED RELEASE ORAL DAILY
Qty: 90 TABLET | Refills: 3 | Status: SHIPPED | OUTPATIENT
Start: 2022-04-21

## 2022-04-21 NOTE — PATIENT INSTRUCTIONS
Thank you for allowing the [facility] to be a part of your care. Please pay close attention to the following medications as they have been changed during this visit.    1.) Please stop taking carvedilol (Coreg).    2.) Please start metoprolol succinate (Toprol XL) 25 mg one time daily

## 2022-04-21 NOTE — LETTER
"4/21/2022    [facility]  [address]  [facility] [address]    RE: Kvng Vann       Dear Colleague,     I had the pleasure of seeing Kvng Vann in the [facility].    HEART CARE NOTE          Assessment/Recommendations     1. HFrEF  Assessment / Plan    Likely 2/2 methamphetamine use and CAD    Near euvolemia on physical exam - reports symptoms consistent with orthostatic hypotension; GDMT as detailed below - hold carvedilol given non-compliance with BID dosing as well as borderline BP; CORE call next week     Current Pharmacotherapy AHA Guideline-Directed Medical Therapy   Lisinopril 5 mg daily Lisinopril 20 mg twice daily   Metoprolol 25 mg daily  Carvedilol 25 mg twice daily   Spironolactone 25 mg Spironolactone 25 mg once daily   Hydralazine NA Hydralazine 100 mg three times daily   Isosorbide dinitrate NA Isosorbide dinitrate 40 mg three times daily   SGLT2 inhibitor not started Dapagliflozin or Empagliflozin 10 mg daily     2. CAD  Assessment / Plan    S/p recent NSTEMI and eventual PCI to mid RCA; coronary angiogram was also significant for 60% prox LCx and other diffuse moderate disease    Denies current chest pain or angina    Continue ASA, clopidogrel, carvedilol, high intensity rosuvastatin, lisinopril and spironolactone    History of Present Illness/Subjective    Mr. Kvng Vann is a 56 year old male with a PMHx significant for CAD (NSTEMI in 3/22), HFrEF, amd methamphetamine abuse who presents to [facility] clinic to establish care.     Recent hospital course:  \" On 3/6/2022 patient presented to [facility] for chest pain found to have NSTEMI but left AMA without further work-up.  Today patient presented to the ED with a concern of intermittent chest pain and dyspnea on exertion.  He found to have acute congestive heart failure.  Echocardiogram done today, final report is pending.  Preliminary result show LVEF 25% with LV dilated and " "severe global hypokinesis.  Cardiologist was consulted.  We tried to convince patient to stay for further treatments and work-up.  However patient adamantly would like to be discharged home. Pt spoke with cardiologist and agree to have outpatient coronary angiogram and possible PCI  Cardiologist started patient on carvedilol, lisinopril, spironolactone and Lasix.  Patient received 1 dose of IV Lasix before discharge\"    Today, Mr. Doherty denies HF symptoms, but does report symptoms concerning for orthostatic hypotension. Management plan as detailed above.      ECG: Personally reviewed. unchanged from previous tracings, normal sinus rhythm, nonspecific ST and T waves changes.    ECHO (personnaly Reviewed):  The left ventricle is moderately dilated.  There is normal left ventricular wall thickness.  The visual ejection fraction is 25-30%.  Diastolic Doppler findings (E/E' ratio and/or other parameters) suggest left  ventricular filling pressures are increased.  There is severe global hypokinesia of the left ventricle.  The right ventricle is normal size.  Mildly decreased right ventricular systolic function  The left atrium is moderate to severely dilated.  There is moderate (2+) mitral regurgitation.  There is mild (1+) tricuspid regurgitation with estimated pulmonary artery  systolic pressure 45 mmHg.     No previous study for comparison.          Physical Examination Review of Systems   BP 90/58 (BP Location: Right arm, Patient Position: Sitting, Cuff Size: Adult Large)   Pulse 79   Resp 16   Ht 1.803 m (5' 11\")   Wt 85.3 kg (188 lb)   BMI 26.22 kg/m    Body mass index is 26.22 kg/m .  Wt Readings from Last 3 Encounters:   04/21/22 85.3 kg (188 lb)   04/04/22 81.6 kg (180 lb)   03/20/22 81.6 kg (180 lb)     General Appearance:   no distress, normal body habitus   ENT/Mouth: membranes moist, no oral lesions or bleeding gums.      EYES:  no scleral icterus, normal conjunctivae   Neck: no carotid bruits or " thyromegaly   Chest/Lungs:   lungs are clear to auscultation, no rales or wheezing, equal chest wall expansion    Cardiovascular:   Regular. Normal first and second heart sounds with no murmurs, rubs, or gallops; the carotid, radial and posterior tibial pulses are intact, no JVD or LE edema bilaterally    Abdomen:  no organomegaly, masses, bruits, or tenderness; bowel sounds are present   Extremities: no cyanosis or clubbing   Skin: no xanthelasma, warm.    Neurologic: alert and oriented x3, calm     Psychiatric: alert and oriented x3, calm     A complete 10 systems ROS was reviewed  And is negative except what is listed in the HPI.          Medical History  Surgical History Family History Social History   Past Medical History:   Diagnosis Date     Hypertension      Methamphetamine abuse (H)     Past Surgical History:   Procedure Laterality Date     CV CORONARY ANGIOGRAM N/A 4/4/2022    Procedure: Coronary Angiogram;  Surgeon: Robert Venegas MD;  Location: [facility] CV     CV PCI N/A 4/4/2022    Procedure: Percutaneous Coronary Intervention;  Surgeon: Robert Venegas MD;  Location: [facility] CV    no family history of premature coronary artery disease Social History     Socioeconomic History     Marital status: Single     Spouse name: Not on file     Number of children: Not on file     Years of education: Not on file     Highest education level: Not on file   Occupational History     Not on file   Tobacco Use     Smoking status: Not on file     Smokeless tobacco: Not on file   Substance and Sexual Activity     Alcohol use: Not on file     Drug use: Yes     Types: Methamphetamines     Sexual activity: Not on file   Other Topics Concern     Parent/sibling w/ CABG, MI or angioplasty before 65F 55M? Not Asked   Social History Narrative     Not on file     Social Determinants of Health     Financial Resource Strain: Not on file   Food Insecurity: Not on file   Transportation Needs: Not on file    Physical Activity: Not on file   Stress: Not on file   Social Connections: Not on file   Intimate Partner Violence: Not on file   Housing Stability: Not on file           Lab Results    Chemistry/lipid CBC Cardiac Enzymes/BNP/TSH/INR   Lab Results   Component Value Date    CHOL 190 04/04/2022    HDL 52 04/04/2022    TRIG 112 04/04/2022    BUN 23 (H) 04/04/2022     04/04/2022    CO2 26 04/04/2022    Lab Results   Component Value Date    WBC 6.4 04/04/2022    HGB 15.1 04/04/2022    HCT 45.7 04/04/2022    MCV 92 04/04/2022     04/04/2022    Lab Results   Component Value Date    TROPONINI 0.06 03/20/2022    BNP 1,987 (H) 03/20/2022    TSH 3.40 03/20/2022    INR 1.06 03/20/2022     Lab Results   Component Value Date    TROPONINI 0.06 03/20/2022          Weight:    Wt Readings from Last 3 Encounters:   04/04/22 81.6 kg (180 lb)   03/20/22 81.6 kg (180 lb)       Allergies  No Known Allergies      Surgical History  Past Surgical History:   Procedure Laterality Date     CV CORONARY ANGIOGRAM N/A 4/4/2022    Procedure: Coronary Angiogram;  Surgeon: Robert Venegas MD;  Location: [facility] CV     CV PCI N/A 4/4/2022    Procedure: Percutaneous Coronary Intervention;  Surgeon: Robert Venegas MD;  Location: [facility] LAB CV       Social History  Tobacco:   History   Smoking Status     Not on file   Smokeless Tobacco     Not on file    Alcohol:   Social History    Substance and Sexual Activity      Alcohol use: Not on file   Illicit Drugs:   History   Drug Use     Types: Methamphetamines       Family History  No family history on file.       Elijah Pickard MD on 4/21/2022      cc: [facility], [facility]    Thank you for allowing me to participate in the care of your patient.      Sincerely,     Elijah Pickard MD     [facility]  cc:   Jaden Nix MD  [address] [place] [id number]

## 2022-04-21 NOTE — PROGRESS NOTES
"  HEART CARE NOTE          Assessment/Recommendations     1. HFrEF  Assessment / Plan    Likely 2/2 methamphetamine use and CAD    Near euvolemia on physical exam - reports symptoms consistent with orthostatic hypotension; GDMT as detailed below - hold carvedilol given non-compliance with BID dosing as well as borderline BP; CORE call next week     Current Pharmacotherapy AHA Guideline-Directed Medical Therapy   Lisinopril 5 mg daily Lisinopril 20 mg twice daily   Metoprolol 25 mg daily  Carvedilol 25 mg twice daily   Spironolactone 25 mg Spironolactone 25 mg once daily   Hydralazine NA Hydralazine 100 mg three times daily   Isosorbide dinitrate NA Isosorbide dinitrate 40 mg three times daily   SGLT2 inhibitor not started Dapagliflozin or Empagliflozin 10 mg daily     2. CAD  Assessment / Plan    S/p recent NSTEMI and eventual PCI to mid RCA; coronary angiogram was also significant for 60% prox LCx and other diffuse moderate disease    Denies current chest pain or angina    Continue ASA, clopidogrel, carvedilol, high intensity rosuvastatin, lisinopril and spironolactone    History of Present Illness/Subjective    Mr. Kvng Vann is a 56 year old male with a PMHx significant for CAD (NSTEMI in 3/22), HFrEF, amd methamphetamine abuse who presents to [facility] clinic to establish care.     Recent hospital course:  \" On 3/6/2022 patient presented to [facility] for chest pain found to have NSTEMI but left AMA without further work-up.  Today patient presented to the ED with a concern of intermittent chest pain and dyspnea on exertion.  He found to have acute congestive heart failure.  Echocardiogram done today, final report is pending.  Preliminary result show LVEF 25% with LV dilated and severe global hypokinesis.  Cardiologist was consulted.  We tried to convince patient to stay for further treatments and work-up.  However patient adamantly would like to be discharged home. Pt spoke with cardiologist and agree to " "have outpatient coronary angiogram and possible PCI  Cardiologist started patient on carvedilol, lisinopril, spironolactone and Lasix.  Patient received 1 dose of IV Lasix before discharge\"    Today, Mr. Doherty denies HF symptoms, but does report symptoms concerning for orthostatic hypotension. Management plan as detailed above.      ECG: Personally reviewed. unchanged from previous tracings, normal sinus rhythm, nonspecific ST and T waves changes.    ECHO (personnaly Reviewed):  The left ventricle is moderately dilated.  There is normal left ventricular wall thickness.  The visual ejection fraction is 25-30%.  Diastolic Doppler findings (E/E' ratio and/or other parameters) suggest left  ventricular filling pressures are increased.  There is severe global hypokinesia of the left ventricle.  The right ventricle is normal size.  Mildly decreased right ventricular systolic function  The left atrium is moderate to severely dilated.  There is moderate (2+) mitral regurgitation.  There is mild (1+) tricuspid regurgitation with estimated pulmonary artery  systolic pressure 45 mmHg.     No previous study for comparison.          Physical Examination Review of Systems   BP 90/58 (BP Location: Right arm, Patient Position: Sitting, Cuff Size: Adult Large)   Pulse 79   Resp 16   Ht 1.803 m (5' 11\")   Wt 85.3 kg (188 lb)   BMI 26.22 kg/m    Body mass index is 26.22 kg/m .  Wt Readings from Last 3 Encounters:   04/21/22 85.3 kg (188 lb)   04/04/22 81.6 kg (180 lb)   03/20/22 81.6 kg (180 lb)     General Appearance:   no distress, normal body habitus   ENT/Mouth: membranes moist, no oral lesions or bleeding gums.      EYES:  no scleral icterus, normal conjunctivae   Neck: no carotid bruits or thyromegaly   Chest/Lungs:   lungs are clear to auscultation, no rales or wheezing, equal chest wall expansion    Cardiovascular:   Regular. Normal first and second heart sounds with no murmurs, rubs, or gallops; the carotid, radial " and posterior tibial pulses are intact, no JVD or LE edema bilaterally    Abdomen:  no organomegaly, masses, bruits, or tenderness; bowel sounds are present   Extremities: no cyanosis or clubbing   Skin: no xanthelasma, warm.    Neurologic: alert and oriented x3, calm     Psychiatric: alert and oriented x3, calm     A complete 10 systems ROS was reviewed  And is negative except what is listed in the HPI.          Medical History  Surgical History Family History Social History   Past Medical History:   Diagnosis Date     Hypertension      Methamphetamine abuse (H)     Past Surgical History:   Procedure Laterality Date     CV CORONARY ANGIOGRAM N/A 4/4/2022    Procedure: Coronary Angiogram;  Surgeon: Robert Venegas MD;  Location: [facility] CATH [facility] CV     CV PCI N/A 4/4/2022    Procedure: Percutaneous Coronary Intervention;  Surgeon: Robert Venegas MD;  Location: [facility] CATH LAB CV    no family history of premature coronary artery disease Social History     Socioeconomic History     Marital status: Single     Spouse name: Not on file     Number of children: Not on file     Years of education: Not on file     Highest education level: Not on file   Occupational History     Not on file   Tobacco Use     Smoking status: Not on file     Smokeless tobacco: Not on file   Substance and Sexual Activity     Alcohol use: Not on file     Drug use: Yes     Types: Methamphetamines     Sexual activity: Not on file   Other Topics Concern     Parent/sibling w/ CABG, MI or angioplasty before 65F 55M? Not Asked   Social History Narrative     Not on file     Social Determinants of Health     Financial Resource Strain: Not on file   Food Insecurity: Not on file   Transportation Needs: Not on file   Physical Activity: Not on file   Stress: Not on file   Social Connections: Not on file   Intimate Partner Violence: Not on file   Housing Stability: Not on file           Lab Results    Chemistry/lipid CBC Cardiac Enzymes/BNP/TSH/INR   Lab  Results   Component Value Date    CHOL 190 04/04/2022    HDL 52 04/04/2022    TRIG 112 04/04/2022    BUN 23 (H) 04/04/2022     04/04/2022    CO2 26 04/04/2022    Lab Results   Component Value Date    WBC 6.4 04/04/2022    HGB 15.1 04/04/2022    HCT 45.7 04/04/2022    MCV 92 04/04/2022     04/04/2022    Lab Results   Component Value Date    TROPONINI 0.06 03/20/2022    BNP 1,987 (H) 03/20/2022    TSH 3.40 03/20/2022    INR 1.06 03/20/2022     Lab Results   Component Value Date    TROPONINI 0.06 03/20/2022          Weight:    Wt Readings from Last 3 Encounters:   04/04/22 81.6 kg (180 lb)   03/20/22 81.6 kg (180 lb)       Allergies  No Known Allergies      Surgical History  Past Surgical History:   Procedure Laterality Date     CV CORONARY ANGIOGRAM N/A 4/4/2022    Procedure: Coronary Angiogram;  Surgeon: Robert Venegas MD;  Location: [facility] CATH LAB CV     CV PCI N/A 4/4/2022    Procedure: Percutaneous Coronary Intervention;  Surgeon: Robert Venegas MD;  Location: [facility] CATH LAB CV       Social History  Tobacco:   History   Smoking Status     Not on file   Smokeless Tobacco     Not on file    Alcohol:   Social History    Substance and Sexual Activity      Alcohol use: Not on file   Illicit Drugs:   History   Drug Use     Types: Methamphetamines       Family History  No family history on file.       Elijah Pickard MD on 4/21/2022      cc: [facility]

## 2022-04-22 ENCOUNTER — TELEPHONE (OUTPATIENT)
Dept: CARDIOLOGY | Facility: CLINIC | Age: 57
End: 2022-04-22
Payer: COMMERCIAL

## 2022-04-22 NOTE — TELEPHONE ENCOUNTER
----- Message from Elijah Pickard MD sent at 4/22/2022  2:05 PM CDT -----  Regarding: Follow-up  Hi Marilyn;  Can you contact Kvng next week Wednesday (4/27/22) to follow-up on how he's doing on his new Bblocker and assess for any HF symptoms?    Thanks;  ~ Lianet

## 2022-04-27 NOTE — TELEPHONE ENCOUNTER
Phone call to patient to follow-up after starting metoprolol succinate last week on 4/21. Patient has not noticed any new HF failure symptoms. Specifically denies new shortness of breath, LE edema, or abdominal bloating.     Follow-up planned on 6/2/22 with Dr. Pickard. -renata

## 2022-05-02 ENCOUNTER — TELEPHONE (OUTPATIENT)
Dept: CARDIOLOGY | Facility: CLINIC | Age: 57
End: 2022-05-02
Payer: COMMERCIAL

## 2022-05-02 DIAGNOSIS — I50.21 ACUTE SYSTOLIC CONGESTIVE HEART FAILURE (H): Primary | ICD-10-CM

## 2022-05-02 RX ORDER — SPIRONOLACTONE 25 MG/1
25 TABLET ORAL DAILY
Qty: 90 TABLET | Refills: 3 | Status: SHIPPED | OUTPATIENT
Start: 2022-05-02

## 2022-05-02 RX ORDER — FUROSEMIDE 40 MG
40 TABLET ORAL DAILY
Qty: 30 TABLET | Refills: 3 | Status: SHIPPED | OUTPATIENT
Start: 2022-05-02 | End: 2023-07-21

## 2022-05-02 RX ORDER — LISINOPRIL 5 MG/1
5 TABLET ORAL DAILY
Qty: 30 TABLET | Refills: 3 | Status: SHIPPED | OUTPATIENT
Start: 2022-05-02 | End: 2023-06-22

## 2022-07-05 ENCOUNTER — TELEPHONE (OUTPATIENT)
Dept: CARDIOLOGY | Facility: CLINIC | Age: 57
End: 2022-07-05

## 2022-07-05 NOTE — TELEPHONE ENCOUNTER
[facility] Call Center    Phone Message    May a detailed message be left on voicemail: yes     Reason for Call: Other: Kvng called to discuss his plan of care going forward. He would like to discuss his artery blockages and what's going to be done about this issue. Please give him a call back to further discuss.     Action Taken: Other: Cardiology    Travel Screening: Not Applicable

## 2022-07-05 NOTE — TELEPHONE ENCOUNTER
Patient is contacted to discuss his current status. He states he is not currently having any chest pain, pressure or heaviness. He feels that he never quite understood what he might need for f/u for his heart care. He was encouraged to make a f/u appt to see cardiology to discuss ongoing med management and to assist him in identification of what symptoms might be important for him to monitor for and report.  He was in agreement with this plan.    Marilyn Fountain RN BSN, CHFN

## 2022-10-01 ENCOUNTER — HEALTH MAINTENANCE LETTER (OUTPATIENT)
Age: 57
End: 2022-10-01

## 2023-06-22 DIAGNOSIS — I50.21 ACUTE SYSTOLIC CONGESTIVE HEART FAILURE (H): ICD-10-CM

## 2023-06-22 RX ORDER — LISINOPRIL 5 MG/1
5 TABLET ORAL DAILY
Qty: 90 TABLET | Refills: 0 | Status: SHIPPED | OUTPATIENT
Start: 2023-06-22

## 2023-06-26 DIAGNOSIS — I21.4 NSTEMI (NON-ST ELEVATED MYOCARDIAL INFARCTION) (H): ICD-10-CM

## 2023-06-26 DIAGNOSIS — I50.21 ACUTE SYSTOLIC CONGESTIVE HEART FAILURE (H): ICD-10-CM

## 2023-06-27 DIAGNOSIS — I21.4 NSTEMI (NON-ST ELEVATED MYOCARDIAL INFARCTION) (H): ICD-10-CM

## 2023-06-27 DIAGNOSIS — I50.21 ACUTE SYSTOLIC CONGESTIVE HEART FAILURE (H): ICD-10-CM

## 2023-06-27 RX ORDER — ROSUVASTATIN CALCIUM 40 MG/1
TABLET, COATED ORAL
Qty: 90 TABLET | Refills: 0 | Status: SHIPPED | OUTPATIENT
Start: 2023-06-27

## 2023-06-27 RX ORDER — CLOPIDOGREL BISULFATE 75 MG/1
TABLET ORAL
Qty: 90 TABLET | Refills: 0 | Status: SHIPPED | OUTPATIENT
Start: 2023-06-27

## 2023-06-28 RX ORDER — ROSUVASTATIN CALCIUM 40 MG/1
TABLET, COATED ORAL
Qty: 90 TABLET | Refills: 3 | OUTPATIENT
Start: 2023-06-28

## 2023-07-21 DIAGNOSIS — I50.21 ACUTE SYSTOLIC CONGESTIVE HEART FAILURE (H): ICD-10-CM

## 2023-07-21 RX ORDER — FUROSEMIDE 40 MG
40 TABLET ORAL DAILY
Qty: 30 TABLET | Refills: 0 | Status: SHIPPED | OUTPATIENT
Start: 2023-07-21

## 2023-09-20 ENCOUNTER — TELEPHONE (OUTPATIENT)
Dept: CARDIOLOGY | Facility: CLINIC | Age: 58
End: 2023-09-20
Payer: COMMERCIAL

## 2023-09-20 NOTE — TELEPHONE ENCOUNTER
Received refill request for plavix and crestor - attempted to reach pt, directed to vm, lm stating no refill due to no follow up in over a year and advised to reach out to pcp. If wanting to continue care with Dr. Pickard, call scheduling - number was provided.    Siria HOGAN RN  BSN

## 2023-10-15 ENCOUNTER — HEALTH MAINTENANCE LETTER (OUTPATIENT)
Age: 58
End: 2023-10-15

## 2024-12-08 ENCOUNTER — HEALTH MAINTENANCE LETTER (OUTPATIENT)
Age: 59
End: 2024-12-08

## (undated) DEVICE — STENT CORONARY DES SYNERGY XD MR US 3.00X32MM H7493941832300: Type: IMPLANTABLE DEVICE | Status: NON-FUNCTIONAL

## (undated) DEVICE — SHEATH 4FR ULTIMUM 407840

## (undated) DEVICE — CATH ANGIO INFINITI JR4 4FRX100CM 538421

## (undated) DEVICE — INTRO MICRO MINI STICK 4FR STIFF NITINOL 45-753

## (undated) DEVICE — CATH GUIDING 5FR X 100CM LA5PAPA1

## (undated) DEVICE — MANIFOLD KIT ANGIO AUTOMATED 014613

## (undated) DEVICE — DEVICE INFLATION SYR W/ HEMOSTASIS VALVE 12IN EXT IN4904

## (undated) DEVICE — SYR ANGIOGRAPHY MULTIUSE KIT ACIST 014612

## (undated) DEVICE — GUIDEWIRE FORTE FLOPPY J TOP 34949-05J

## (undated) DEVICE — GUIDEWIRE STARTER .035INX150CM

## (undated) DEVICE — KIT HAND CONTROL ACIST 014644 AR-P54

## (undated) DEVICE — CUSTOM PACK CORONARY SAN5BCRHEA

## (undated) DEVICE — CATH BALLOON EMERGE 2.5X15MM H7493918915250

## (undated) DEVICE — SLEEVE TR BAND RADIAL COMPRESSION DEVICE 24CM TRB24-REG

## (undated) DEVICE — ELECTRODE ADULT PACING MULTI P-211-M1

## (undated) DEVICE — CATH LAUNCHER 6FR JR 4.0 LA6JR40

## (undated) DEVICE — CATH ANGIO INFINITI JL4 4FRX100CM 538420

## (undated) DEVICE — SHEATH GLIDE RADIAL 5FR 10CM .021

## (undated) DEVICE — SHEATH ULTIMUM 6FR 12CM 407845

## (undated) RX ORDER — OXYCODONE HYDROCHLORIDE 5 MG/1
TABLET ORAL
Status: DISPENSED
Start: 2022-04-04

## (undated) RX ORDER — FENTANYL CITRATE 50 UG/ML
INJECTION, SOLUTION INTRAMUSCULAR; INTRAVENOUS
Status: DISPENSED
Start: 2022-04-04

## (undated) RX ORDER — CLOPIDOGREL 300 MG/1
TABLET, FILM COATED ORAL
Status: DISPENSED
Start: 2022-04-04

## (undated) RX ORDER — ASPIRIN 325 MG
TABLET ORAL
Status: DISPENSED
Start: 2022-04-04

## (undated) RX ORDER — DIAZEPAM 5 MG
TABLET ORAL
Status: DISPENSED
Start: 2022-04-04